# Patient Record
Sex: FEMALE | Race: BLACK OR AFRICAN AMERICAN | NOT HISPANIC OR LATINO | URBAN - METROPOLITAN AREA
[De-identification: names, ages, dates, MRNs, and addresses within clinical notes are randomized per-mention and may not be internally consistent; named-entity substitution may affect disease eponyms.]

---

## 2021-03-31 ENCOUNTER — EMERGENCY (EMERGENCY)
Facility: HOSPITAL | Age: 31
LOS: 1 days | Discharge: HOME | End: 2021-03-31
Admitting: EMERGENCY MEDICINE
Payer: SELF-PAY

## 2021-03-31 VITALS
HEART RATE: 93 BPM | DIASTOLIC BLOOD PRESSURE: 65 MMHG | RESPIRATION RATE: 18 BRPM | SYSTOLIC BLOOD PRESSURE: 107 MMHG | TEMPERATURE: 98 F | OXYGEN SATURATION: 98 %

## 2021-03-31 VITALS
SYSTOLIC BLOOD PRESSURE: 128 MMHG | HEART RATE: 113 BPM | OXYGEN SATURATION: 97 % | RESPIRATION RATE: 18 BRPM | TEMPERATURE: 98 F | DIASTOLIC BLOOD PRESSURE: 64 MMHG | WEIGHT: 149.91 LBS

## 2021-03-31 DIAGNOSIS — R07.89 OTHER CHEST PAIN: ICD-10-CM

## 2021-03-31 DIAGNOSIS — D64.9 ANEMIA, UNSPECIFIED: ICD-10-CM

## 2021-03-31 DIAGNOSIS — R00.2 PALPITATIONS: ICD-10-CM

## 2021-03-31 DIAGNOSIS — F19.929 OTHER PSYCHOACTIVE SUBSTANCE USE, UNSPECIFIED WITH INTOXICATION, UNSPECIFIED: ICD-10-CM

## 2021-03-31 LAB
ALBUMIN SERPL ELPH-MCNC: 4.1 G/DL — SIGNIFICANT CHANGE UP (ref 3.4–5)
ALP SERPL-CCNC: 60 U/L — SIGNIFICANT CHANGE UP (ref 40–120)
ALT FLD-CCNC: 26 U/L — SIGNIFICANT CHANGE UP (ref 12–42)
ANION GAP SERPL CALC-SCNC: 12 MMOL/L — SIGNIFICANT CHANGE UP (ref 9–16)
APTT BLD: 29.3 SEC — SIGNIFICANT CHANGE UP (ref 27.5–35.5)
AST SERPL-CCNC: 16 U/L — SIGNIFICANT CHANGE UP (ref 15–37)
BASOPHILS # BLD AUTO: 0.03 K/UL — SIGNIFICANT CHANGE UP (ref 0–0.2)
BASOPHILS NFR BLD AUTO: 0.3 % — SIGNIFICANT CHANGE UP (ref 0–2)
BILIRUB SERPL-MCNC: 0.2 MG/DL — SIGNIFICANT CHANGE UP (ref 0.2–1.2)
BUN SERPL-MCNC: 17 MG/DL — SIGNIFICANT CHANGE UP (ref 7–23)
CALCIUM SERPL-MCNC: 9.3 MG/DL — SIGNIFICANT CHANGE UP (ref 8.5–10.5)
CHLORIDE SERPL-SCNC: 109 MMOL/L — HIGH (ref 96–108)
CO2 SERPL-SCNC: 21 MMOL/L — LOW (ref 22–31)
CREAT SERPL-MCNC: 0.95 MG/DL — SIGNIFICANT CHANGE UP (ref 0.5–1.3)
EOSINOPHIL # BLD AUTO: 0 K/UL — SIGNIFICANT CHANGE UP (ref 0–0.5)
EOSINOPHIL NFR BLD AUTO: 0 % — SIGNIFICANT CHANGE UP (ref 0–6)
GLUCOSE SERPL-MCNC: 135 MG/DL — HIGH (ref 70–99)
HCG SERPL-ACNC: 1 MIU/ML — SIGNIFICANT CHANGE UP
HCT VFR BLD CALC: 31 % — LOW (ref 34.5–45)
HGB BLD-MCNC: 9.1 G/DL — LOW (ref 11.5–15.5)
HYPOCHROMIA BLD QL: SLIGHT — SIGNIFICANT CHANGE UP
IMM GRANULOCYTES NFR BLD AUTO: 0.5 % — SIGNIFICANT CHANGE UP (ref 0–1.5)
INR BLD: 1.01 — SIGNIFICANT CHANGE UP (ref 0.88–1.16)
LYMPHOCYTES # BLD AUTO: 0.6 K/UL — LOW (ref 1–3.3)
LYMPHOCYTES # BLD AUTO: 6.5 % — LOW (ref 13–44)
MANUAL SMEAR VERIFICATION: SIGNIFICANT CHANGE UP
MCHC RBC-ENTMCNC: 21.5 PG — LOW (ref 27–34)
MCHC RBC-ENTMCNC: 29.4 GM/DL — LOW (ref 32–36)
MCV RBC AUTO: 73.1 FL — LOW (ref 80–100)
MICROCYTES BLD QL: SLIGHT — SIGNIFICANT CHANGE UP
MONOCYTES # BLD AUTO: 0.49 K/UL — SIGNIFICANT CHANGE UP (ref 0–0.9)
MONOCYTES NFR BLD AUTO: 5.3 % — SIGNIFICANT CHANGE UP (ref 2–14)
NEUTROPHILS # BLD AUTO: 8.06 K/UL — HIGH (ref 1.8–7.4)
NEUTROPHILS NFR BLD AUTO: 87.4 % — HIGH (ref 43–77)
NRBC # BLD: 0 /100 WBCS — SIGNIFICANT CHANGE UP (ref 0–0)
NT-PROBNP SERPL-SCNC: 71 PG/ML — SIGNIFICANT CHANGE UP
PLAT MORPH BLD: NORMAL — SIGNIFICANT CHANGE UP
PLATELET # BLD AUTO: 275 K/UL — SIGNIFICANT CHANGE UP (ref 150–400)
POTASSIUM SERPL-MCNC: 4.1 MMOL/L — SIGNIFICANT CHANGE UP (ref 3.5–5.3)
POTASSIUM SERPL-SCNC: 4.1 MMOL/L — SIGNIFICANT CHANGE UP (ref 3.5–5.3)
PROT SERPL-MCNC: 7.7 G/DL — SIGNIFICANT CHANGE UP (ref 6.4–8.2)
PROTHROM AB SERPL-ACNC: 11.9 SEC — SIGNIFICANT CHANGE UP (ref 10.6–13.6)
RBC # BLD: 4.24 M/UL — SIGNIFICANT CHANGE UP (ref 3.8–5.2)
RBC # FLD: 16.2 % — HIGH (ref 10.3–14.5)
RBC BLD AUTO: ABNORMAL
SODIUM SERPL-SCNC: 142 MMOL/L — SIGNIFICANT CHANGE UP (ref 132–145)
WBC # BLD: 9.23 K/UL — SIGNIFICANT CHANGE UP (ref 3.8–10.5)
WBC # FLD AUTO: 9.23 K/UL — SIGNIFICANT CHANGE UP (ref 3.8–10.5)

## 2021-03-31 PROCEDURE — 93010 ELECTROCARDIOGRAM REPORT: CPT

## 2021-03-31 PROCEDURE — 99284 EMERGENCY DEPT VISIT MOD MDM: CPT

## 2021-03-31 PROCEDURE — 99053 MED SERV 10PM-8AM 24 HR FAC: CPT

## 2021-03-31 NOTE — ED ADULT TRIAGE NOTE - CHIEF COMPLAINT QUOTE
BIBA ambulatory staying in a hotel, complaining of chest discomfort and palpitations after eating a piece of brownie. Not her first time, as per patient. Denies alcohol use and denies use of birth control.

## 2021-03-31 NOTE — ED PROVIDER NOTE - PATIENT PORTAL LINK FT
You can access the FollowMyHealth Patient Portal offered by [facility] by registering at the following website: [email or web address]. By joining [facility]’s FollowMyHealth portal, you will also be able to view your health information using other applications (apps) compatible with our system.

## 2021-03-31 NOTE — ED PROVIDER NOTE - CARE PROVIDER_API CALL
Mary Carmen Conklin (DO)  [place] [address]  Phone: [phone]  Fax: [phone]  Follow Up Time:

## 2021-03-31 NOTE — ED PROVIDER NOTE - OBJECTIVE STATEMENT
29 y/o F presents to ED c/o chest discomfort and her heart racing after eating an edible brownie.  She states her heart started to race.  She reports never having had an edible and the feeling made her nervous.    Pt denies trauma/falls, fevers/chills, neck or back pain, headache, visual changes, sore throat, chest pain, cough, SOB, abd pain, n/v/d, dysuria, hematuria, weakness, dizziness, numbness, lower extremity swelling, rash, sick contacts, recent hospitalizations, recent travels.

## 2022-05-21 ENCOUNTER — HOSPITAL ENCOUNTER (EMERGENCY)
Facility: HOSPITAL | Age: 32
Discharge: HOME | End: 2022-05-21
Attending: EMERGENCY MEDICINE
Payer: COMMERCIAL

## 2022-05-21 ENCOUNTER — APPOINTMENT (EMERGENCY)
Dept: RADIOLOGY | Facility: HOSPITAL | Age: 32
End: 2022-05-21
Payer: COMMERCIAL

## 2022-05-21 VITALS
OXYGEN SATURATION: 100 % | HEART RATE: 59 BPM | RESPIRATION RATE: 18 BRPM | TEMPERATURE: 98.1 F | BODY MASS INDEX: 24.63 KG/M2 | HEIGHT: 62 IN | SYSTOLIC BLOOD PRESSURE: 91 MMHG | WEIGHT: 133.82 LBS | DIASTOLIC BLOOD PRESSURE: 53 MMHG

## 2022-05-21 DIAGNOSIS — F41.9 ANXIETY: Primary | ICD-10-CM

## 2022-05-21 DIAGNOSIS — R07.9 CHEST PAIN: ICD-10-CM

## 2022-05-21 LAB
ALBUMIN SERPL BCP-MCNC: 3.6 G/DL (ref 3.5–5)
ALP SERPL-CCNC: 46 U/L (ref 46–116)
ALT SERPL W P-5'-P-CCNC: 22 U/L (ref 12–78)
ANION GAP SERPL CALCULATED.3IONS-SCNC: 9 MMOL/L (ref 4–13)
AST SERPL W P-5'-P-CCNC: 51 U/L (ref 5–45)
ATRIAL RATE: 59 BPM
ATRIAL RATE: 70 BPM
BASOPHILS # BLD AUTO: 0.05 THOUSANDS/ΜL (ref 0–0.1)
BASOPHILS NFR BLD AUTO: 1 % (ref 0–1)
BILIRUB SERPL-MCNC: 0.64 MG/DL (ref 0.2–1)
BUN SERPL-MCNC: 8 MG/DL (ref 5–25)
CALCIUM SERPL-MCNC: 8.8 MG/DL (ref 8.3–10.1)
CARDIAC TROPONIN I PNL SERPL HS: <2 NG/L
CARDIAC TROPONIN I PNL SERPL HS: <2 NG/L
CHLORIDE SERPL-SCNC: 106 MMOL/L (ref 100–108)
CO2 SERPL-SCNC: 25 MMOL/L (ref 21–32)
CREAT SERPL-MCNC: 0.54 MG/DL (ref 0.6–1.3)
EOSINOPHIL # BLD AUTO: 0.06 THOUSAND/ΜL (ref 0–0.61)
EOSINOPHIL NFR BLD AUTO: 1 % (ref 0–6)
ERYTHROCYTE [DISTWIDTH] IN BLOOD BY AUTOMATED COUNT: 17.2 % (ref 11.6–15.1)
GFR SERPL CREATININE-BSD FRML MDRD: 126 ML/MIN/1.73SQ M
GLUCOSE SERPL-MCNC: 75 MG/DL (ref 65–140)
HCG SERPL QL: NEGATIVE
HCT VFR BLD AUTO: 31.8 % (ref 34.8–46.1)
HGB BLD-MCNC: 9.5 G/DL (ref 11.5–15.4)
IMM GRANULOCYTES # BLD AUTO: 0.01 THOUSAND/UL (ref 0–0.2)
IMM GRANULOCYTES NFR BLD AUTO: 0 % (ref 0–2)
LYMPHOCYTES # BLD AUTO: 1.73 THOUSANDS/ΜL (ref 0.6–4.47)
LYMPHOCYTES NFR BLD AUTO: 31 % (ref 14–44)
MCH RBC QN AUTO: 21 PG (ref 26.8–34.3)
MCHC RBC AUTO-ENTMCNC: 29.9 G/DL (ref 31.4–37.4)
MCV RBC AUTO: 70 FL (ref 82–98)
MONOCYTES # BLD AUTO: 0.39 THOUSAND/ΜL (ref 0.17–1.22)
MONOCYTES NFR BLD AUTO: 7 % (ref 4–12)
NEUTROPHILS # BLD AUTO: 3.31 THOUSANDS/ΜL (ref 1.85–7.62)
NEUTS SEG NFR BLD AUTO: 60 % (ref 43–75)
NRBC BLD AUTO-RTO: 0 /100 WBCS
P AXIS: 63 DEGREES
P AXIS: 64 DEGREES
PLATELET # BLD AUTO: 358 THOUSANDS/UL (ref 149–390)
PMV BLD AUTO: 10.3 FL (ref 8.9–12.7)
POTASSIUM SERPL-SCNC: 4.4 MMOL/L (ref 3.5–5.3)
PR INTERVAL: 132 MS
PR INTERVAL: 132 MS
PROT SERPL-MCNC: 7.4 G/DL (ref 6.4–8.2)
QRS AXIS: 93 DEGREES
QRS AXIS: 94 DEGREES
QRSD INTERVAL: 86 MS
QRSD INTERVAL: 86 MS
QT INTERVAL: 400 MS
QT INTERVAL: 418 MS
QTC INTERVAL: 413 MS
QTC INTERVAL: 432 MS
RBC # BLD AUTO: 4.53 MILLION/UL (ref 3.81–5.12)
SODIUM SERPL-SCNC: 140 MMOL/L (ref 136–145)
T WAVE AXIS: 55 DEGREES
T WAVE AXIS: 65 DEGREES
VENTRICULAR RATE: 59 BPM
VENTRICULAR RATE: 70 BPM
WBC # BLD AUTO: 5.55 THOUSAND/UL (ref 4.31–10.16)

## 2022-05-21 PROCEDURE — 71046 X-RAY EXAM CHEST 2 VIEWS: CPT

## 2022-05-21 PROCEDURE — 99285 EMERGENCY DEPT VISIT HI MDM: CPT | Performed by: EMERGENCY MEDICINE

## 2022-05-21 PROCEDURE — 84484 ASSAY OF TROPONIN QUANT: CPT | Performed by: EMERGENCY MEDICINE

## 2022-05-21 PROCEDURE — 84703 CHORIONIC GONADOTROPIN ASSAY: CPT | Performed by: EMERGENCY MEDICINE

## 2022-05-21 PROCEDURE — 93010 ELECTROCARDIOGRAM REPORT: CPT | Performed by: INTERNAL MEDICINE

## 2022-05-21 PROCEDURE — NC001 PR NO CHARGE: Performed by: EMERGENCY MEDICINE

## 2022-05-21 PROCEDURE — 93005 ELECTROCARDIOGRAM TRACING: CPT

## 2022-05-21 PROCEDURE — 36415 COLL VENOUS BLD VENIPUNCTURE: CPT | Performed by: EMERGENCY MEDICINE

## 2022-05-21 PROCEDURE — 99283 EMERGENCY DEPT VISIT LOW MDM: CPT

## 2022-05-21 PROCEDURE — 85025 COMPLETE CBC W/AUTO DIFF WBC: CPT | Performed by: EMERGENCY MEDICINE

## 2022-05-21 PROCEDURE — 80053 COMPREHEN METABOLIC PANEL: CPT | Performed by: EMERGENCY MEDICINE

## 2022-05-21 RX ORDER — SODIUM CHLORIDE 9 MG/ML
3 INJECTION INTRAVENOUS
Status: DISCONTINUED | OUTPATIENT
Start: 2022-05-21 | End: 2022-05-21 | Stop reason: HOSPADM

## 2022-05-21 NOTE — ED PROVIDER NOTES
History  Chief Complaint   Patient presents with    Anxiety     Pt stated she is currently staying in her car; homeless c/o "heavy weight on chest"  Started 20 minutes ago  33 y/o female, hx of anxiety, presents to the ED for chest pain and anxiety  Patient states that she developed heaviness in the center of her chest about 20 minutes ago  States that she feels anxious as well  Denies any fever, cough, sob, abd pain, n/v, d/c, or urinary symptoms  States that she is homeless and living in her car  States that she was having "flashbacks" when the symptoms began  She denies any SI/HI, drug/alcohol use, or hallucinations  Has not tried anything for the symptoms  No other complaints  History provided by:  Patient  Chest Pain  Pain location:  Substernal area  Pain quality comment:  Heaviness   Pain severity:  Mild  Onset quality:  Sudden  Duration:  20 minutes  Timing:  Constant  Progression:  Unchanged  Chronicity:  New  Relieved by:  None tried  Worsened by:  Nothing tried  Ineffective treatments:  None tried  Associated symptoms: no abdominal pain, no anxiety, no cough, no fever, no headache, no nausea, no numbness, no shortness of breath, not vomiting and no weakness        None       History reviewed  No pertinent past medical history  History reviewed  No pertinent surgical history  History reviewed  No pertinent family history  I have reviewed and agree with the history as documented  E-Cigarette/Vaping     E-Cigarette/Vaping Substances     Social History     Tobacco Use    Smoking status: Never Smoker    Smokeless tobacco: Never Used   Substance Use Topics    Alcohol use: Never    Drug use: Never       Review of Systems   Constitutional: Negative for chills and fever  HENT: Negative for congestion, ear pain and sore throat  Eyes: Negative for pain and visual disturbance  Respiratory: Negative for cough, shortness of breath and wheezing  Cardiovascular: Positive for chest pain  Negative for leg swelling  Gastrointestinal: Negative for abdominal pain, diarrhea, nausea and vomiting  Genitourinary: Negative for dysuria, frequency, hematuria and urgency  Musculoskeletal: Negative for neck pain and neck stiffness  Skin: Negative for rash and wound  Neurological: Negative for weakness, numbness and headaches  Psychiatric/Behavioral: Negative for agitation and confusion  All other systems reviewed and are negative  Physical Exam  Physical Exam  Vitals and nursing note reviewed  Constitutional:       Appearance: She is well-developed  HENT:      Head: Normocephalic and atraumatic  Eyes:      Pupils: Pupils are equal, round, and reactive to light  Cardiovascular:      Rate and Rhythm: Normal rate and regular rhythm  Pulmonary:      Effort: Pulmonary effort is normal       Breath sounds: Normal breath sounds  Abdominal:      General: Bowel sounds are normal       Palpations: Abdomen is soft  Musculoskeletal:         General: Normal range of motion  Cervical back: Normal range of motion and neck supple  Skin:     General: Skin is warm and dry  Neurological:      General: No focal deficit present  Mental Status: She is alert and oriented to person, place, and time        Comments: No focal deficits         Vital Signs  ED Triage Vitals [05/21/22 0413]   Temperature Pulse Respirations Blood Pressure SpO2   98 1 °F (36 7 °C) 66 18 111/67 100 %      Temp Source Heart Rate Source Patient Position - Orthostatic VS BP Location FiO2 (%)   Oral Monitor Sitting Right arm --      Pain Score       No Pain           Vitals:    05/21/22 0413 05/21/22 0600   BP: 111/67 91/53   Pulse: 66 59   Patient Position - Orthostatic VS: Sitting Lying         Visual Acuity      ED Medications  Medications - No data to display    Diagnostic Studies  Results Reviewed     Procedure Component Value Units Date/Time    HS Troponin I 2hr [[id number]] Collected: 05/21/22 0718    Lab Status: Final result Specimen: Blood from Hand, Right Updated: 05/21/22 0751     hs TnI 2hr <2 ng/L      Delta 2hr hsTnI --    Comprehensive metabolic panel [[id number]]  (Abnormal) Collected: 05/21/22 0500    Lab Status: Final result Specimen: Blood from Arm, Right Updated: 05/21/22 0557     Sodium 140 mmol/L      Potassium 4 4 mmol/L      Chloride 106 mmol/L      CO2 25 mmol/L      ANION GAP 9 mmol/L      BUN 8 mg/dL      Creatinine 0 54 mg/dL      Glucose 75 mg/dL      Calcium 8 8 mg/dL      AST 51 U/L      ALT 22 U/L      Alkaline Phosphatase 46 U/L      Total Protein 7 4 g/dL      Albumin 3 6 g/dL      Total Bilirubin 0 64 mg/dL      eGFR 126 ml/min/1 73sq m     Narrative:      National Kidney Disease Foundation guidelines for Chronic Kidney Disease (CKD):     Stage 1 with normal or high GFR (GFR > 90 mL/min/1 73 square meters)    Stage 2 Mild CKD (GFR = 60-89 mL/min/1 73 square meters)    Stage 3A Moderate CKD (GFR = 45-59 mL/min/1 73 square meters)    Stage 3B Moderate CKD (GFR = 30-44 mL/min/1 73 square meters)    Stage 4 Severe CKD (GFR = 15-29 mL/min/1 73 square meters)    Stage 5 End Stage CKD (GFR <15 mL/min/1 73 square meters)  Note: GFR calculation is accurate only with a steady state creatinine    hCG, qualitative pregnancy [[id number]]  (Normal) Collected: 05/21/22 0500    Lab Status: Final result Specimen: Blood from Arm, Right Updated: 05/21/22 0557     Preg, Serum Negative    HS Troponin 0hr (reflex protocol) [[id number]]  (Normal) Collected: 05/21/22 0500    Lab Status: Final result Specimen: Blood from Arm, Right Updated: 05/21/22 0542     hs TnI 0hr <2 ng/L     CBC and differential [[id number]]  (Abnormal) Collected: 05/21/22 0500    Lab Status: Final result Specimen: Blood from Arm, Right Updated: 05/21/22 0516     WBC 5 55 Thousand/uL      RBC 4 53 Million/uL      Hemoglobin 9 5 g/dL      Hematocrit 31 8 %      MCV 70 fL      MCH 21 0 pg      MCHC 29 9 g/dL      RDW 17 2 %      MPV 10 3 fL Platelets 071 Thousands/uL      nRBC 0 /100 WBCs      Neutrophils Relative 60 %      Immat GRANS % 0 %      Lymphocytes Relative 31 %      Monocytes Relative 7 %      Eosinophils Relative 1 %      Basophils Relative 1 %      Neutrophils Absolute 3 31 Thousands/µL      Immature Grans Absolute 0 01 Thousand/uL      Lymphocytes Absolute 1 73 Thousands/µL      Monocytes Absolute 0 39 Thousand/µL      Eosinophils Absolute 0 06 Thousand/µL      Basophils Absolute 0 05 Thousands/µL                  X-ray chest 2 views   ED Interpretation by Saran Ramos DO (05/21 0636)   NAP       Final Result by Sabrina Earl MD (05/21 3706)      No acute cardiopulmonary disease  Workstation performed: [id number]                    Procedures  ECG 12 Lead Documentation Only    Date/Time: 5/21/2022 4:51 AM  Performed by: Saran Ramos DO  Authorized by: Saran Ramos DO     Indications / Diagnosis:  Chest pain   Patient location:  ED  Previous ECG:     Previous ECG:  Unavailable  Rate:     ECG rate:  59    ECG rate assessment: bradycardic    Rhythm:     Rhythm: sinus bradycardia    Ectopy:     Ectopy: none    QRS:     QRS axis:  Right    QRS intervals:  Normal  ST segments:     ST segments:  Normal  T waves:     T waves: normal               ED Course  ED Course as of 05/22/22 0713   Sat May 21, 2022   0543 hs TnI 0hr: <2                                             MDM  Number of Diagnoses or Management Options  Anxiety: new and requires workup  Chest pain: new and requires workup  Diagnosis management comments: Patient with chest pain and anxiety- will get cardiac workup including trop/ ekg  Case signed out to Dr Marylou Medina, pending delta trop/ekg and likely d/c home if neg         Amount and/or Complexity of Data Reviewed  Clinical lab tests: ordered and reviewed  Tests in the radiology section of CPT®: ordered and reviewed  Tests in the medicine section of CPT®: ordered and reviewed  Discussion of test results with the performing providers: yes  Decide to obtain previous medical records or to obtain history from someone other than the patient: yes  Obtain history from someone other than the patient: yes  Review and summarize past medical records: yes  Discuss the patient with other providers: yes    Patient Progress  Patient progress: improved      Disposition  Final diagnoses:   Anxiety   Chest pain     Time reflects when diagnosis was documented in both MDM as applicable and the Disposition within this note     Time User Action Codes Description Comment    5/21/2022  6:37 AM Ahsan ROMERO Add [F41 9] Anxiety     5/21/2022  6:37 AM Tahira Solis Add [R07 9] Chest pain       ED Disposition     ED Disposition   Discharge    Condition   Stable    Date/Time   Sat May 21, 2022  6:37 AM    Comment   Zhanna Meter discharge to home/self care  Follow-up Information     Follow up With Specialties Details Why Contact Info Additional [address], 10 [facility] Internal Medicine, Nurse Practitioner Call in 1 day for follow up within 1 week [address]  [facility] [phone]       5[address] [facility] Emergency Department Emergency Medicine Go to  immediately for any new or worsening symptoms [address] [facility]  [address] [facility] 109 [facility] Emergency Department, [address] [place] [address], [id number]          There are no discharge medications for this patient  No discharge procedures on file      PDMP Review     None          ED Provider  Electronically Signed by           Radha Todd DO  05/22/22 9783

## 2022-05-21 NOTE — ED PROVIDER NOTES
Care patient assumed from Dr Nancy Trinidad  For full details, please see her note  Briefly, this is a 17-year-old female with a history of anxiety who presents here today with chest pain starting shortly prior to arrival   Workup thus far has been normal   She is pending delta troponin and if this is normal can be discharged home  Second troponin is still undetectable  She is to be discharged home for further outpatient follow-up  Final diagnoses:   Anxiety   Chest pain     Time reflects when diagnosis was documented in both MDM as applicable and the Disposition within this note     Time User Action Codes Description Comment    5/21/2022  6:37 AM Lizzeth ROMERO Add [F41 9] Anxiety     5/21/2022  6:37 AM Amy Rosario Add [R07 9] Chest pain       ED Disposition     ED Disposition   Discharge    Condition   Stable    Date/Time   Sat May 21, 2022  6:37 AM    Comment   Ciro Olivier discharge to home/self care                 Follow-up Information     Follow up With Specialties Details Why Contact Info Additional [address], 10 [facility]  Internal Medicine, Nurse Practitioner Call in 1 day for follow up within 1 week [address]  [place] [address] [phone]       5324 [facility] Emergency Department Emergency Medicine Go to  immediately for any new or worsening symptoms [facility] [place] [address]  [phone] [address] [facility] Emergency Department, [address] [place], [place], [address]             Tamia Callahan MD  05/21/22 7674

## 2022-05-23 ENCOUNTER — HOSPITAL ENCOUNTER (EMERGENCY)
Facility: HOSPITAL | Age: 32
Discharge: HOME | End: 2022-05-23
Attending: EMERGENCY MEDICINE | Admitting: EMERGENCY MEDICINE
Payer: COMMERCIAL

## 2022-05-23 VITALS
SYSTOLIC BLOOD PRESSURE: 118 MMHG | HEART RATE: 77 BPM | DIASTOLIC BLOOD PRESSURE: 73 MMHG | TEMPERATURE: 98.4 F | OXYGEN SATURATION: 100 % | RESPIRATION RATE: 16 BRPM

## 2022-05-23 DIAGNOSIS — F41.9 ANXIETY: Primary | ICD-10-CM

## 2022-05-23 LAB
ATRIAL RATE: 72 BPM
P AXIS: 78 DEGREES
PR INTERVAL: 128 MS
QRS AXIS: 93 DEGREES
QRSD INTERVAL: 80 MS
QT INTERVAL: 388 MS
QTC INTERVAL: 424 MS
T WAVE AXIS: 79 DEGREES
VENTRICULAR RATE: 72 BPM

## 2022-05-23 PROCEDURE — 99284 EMERGENCY DEPT VISIT MOD MDM: CPT | Performed by: EMERGENCY MEDICINE

## 2022-05-23 PROCEDURE — 93005 ELECTROCARDIOGRAM TRACING: CPT

## 2022-05-23 PROCEDURE — 99284 EMERGENCY DEPT VISIT MOD MDM: CPT

## 2022-05-23 PROCEDURE — 93010 ELECTROCARDIOGRAM REPORT: CPT | Performed by: INTERNAL MEDICINE

## 2022-05-23 NOTE — DISCHARGE INSTRUCTIONS
Follow up with resources as provided by crisis, return to ED if you feel like hurting yourself or anyone else or call 911

## 2022-05-23 NOTE — ED PROVIDER NOTES
History  Chief Complaint   Patient presents with    Anxiety     Pt states she was smoking marijuana with a friend this morning and now feels anxious      HPI  33 yo F presents with anxiety  She states that she smoked marijuana today and started feeling anxious after this  She denies SI/HI  She states that she wants to talk to crisis about resources because she feels like she needs to be on medications for symptoms  She was in the ED for the same two days ago and had chest pain, work up negative  None       No past medical history on file  No past surgical history on file  No family history on file  I have reviewed and agree with the history as documented  E-Cigarette/Vaping     E-Cigarette/Vaping Substances     Social History     Tobacco Use    Smoking status: Never Smoker    Smokeless tobacco: Never Used   Substance Use Topics    Alcohol use: Never    Drug use: Never       Review of Systems   Constitutional: Negative for chills and fever  HENT: Negative for dental problem and ear pain  Eyes: Negative for pain and redness  Respiratory: Negative for cough and shortness of breath  Cardiovascular: Negative for chest pain and palpitations  Gastrointestinal: Negative for abdominal pain and nausea  Endocrine: Negative for polydipsia and polyphagia  Genitourinary: Negative for dysuria and frequency  Musculoskeletal: Negative for arthralgias and joint swelling  Skin: Negative for color change and rash  Neurological: Negative for dizziness and headaches  Psychiatric/Behavioral: Negative for behavioral problems and confusion  The patient is nervous/anxious  All other systems reviewed and are negative  Physical Exam  Physical Exam  Vitals and nursing note reviewed  Constitutional:       General: She is not in acute distress  Appearance: She is well-developed  She is not diaphoretic  HENT:      Head: Atraumatic        Right Ear: External ear normal       Left Ear: External ear normal       Nose: Nose normal    Eyes:      Conjunctiva/sclera: Conjunctivae normal       Pupils: Pupils are equal, round, and reactive to light  Neck:      Vascular: No JVD  Cardiovascular:      Rate and Rhythm: Normal rate and regular rhythm  Heart sounds: Normal heart sounds  No murmur heard  Pulmonary:      Effort: Pulmonary effort is normal  No respiratory distress  Breath sounds: Normal breath sounds  No wheezing  Abdominal:      General: Bowel sounds are normal  There is no distension  Palpations: Abdomen is soft  Tenderness: There is no abdominal tenderness  Musculoskeletal:         General: Normal range of motion  Cervical back: Normal range of motion and neck supple  Skin:     General: Skin is warm and dry  Capillary Refill: Capillary refill takes less than 2 seconds  Neurological:      Mental Status: She is alert and oriented to person, place, and time  Cranial Nerves: No cranial nerve deficit     Psychiatric:         Behavior: Behavior normal          Vital Signs  ED Triage Vitals   Temperature Pulse Respirations Blood Pressure SpO2   05/23/22 1307 05/23/22 1200 05/23/22 1200 05/23/22 1200 05/23/22 1200   98 4 °F (36 9 °C) (!) 118 16 145/96 100 %      Temp src Heart Rate Source Patient Position - Orthostatic VS BP Location FiO2 (%)   -- 05/23/22 1200 05/23/22 1200 05/23/22 1200 --    Monitor Sitting Left arm       Pain Score       --                  Vitals:    05/23/22 1200 05/23/22 1307 05/23/22 1314   BP: 145/96 118/66 118/73   Pulse: (!) 118 79 77   Patient Position - Orthostatic VS: Sitting           Visual Acuity      ED Medications  Medications - No data to display    Diagnostic Studies  Results Reviewed     None                 No orders to display              Procedures  ECG 12 Lead Documentation Only    Date/Time: 5/23/2022 2:20 PM  Performed by: Shahnaz Merlos MD  Authorized by: Shahnaz Merlos MD     Comments:      NSR rate of 72 RAD no acute ST elevations or depressions             ED Course                               SBIRT 20yo+    Flowsheet Row Most Recent Value   SBIRT (23 yo +)    In order to provide better care to our patients, we are screening all of our patients for alcohol and drug use  Would it be okay to ask you these screening questions? Unable to answer at this time Filed at: 05/23/2022 1303                    MDM  Patient presents with anxiety after smoking marijuana today  Denies HI/SI  Requesting to speak with crisis worker for outpatient resources  Patient was recently seen in the ED for the same and had negative workup for her anxiety/chest pain  Patient provided with outpatient resources by ED crisis  Disposition  Final diagnoses:   Anxiety     Time reflects when diagnosis was documented in both MDM as applicable and the Disposition within this note     Time User Action Codes Description Comment    5/23/2022  1:55 PM Calin Keller Add [F41 9] Anxiety       ED Disposition     None      Follow-up Information    None         Patient's Medications    No medications on file       No discharge procedures on file      PDMP Review     None          ED Provider  Electronically Signed by           Yusuf Martins MD  05/23/22 4204

## 2022-05-23 NOTE — ED NOTES
Pt presents to the ED from home as a self referral  Pt reports having smoking "weed" with this yohan and "starting feeling anxious afterwards " Pt denies SI's / HI's and or AVH's  Pt does not report any hx of MH illness  Pt is receptive to receiving an OP referral packet at this time  Pt reports, "the yohan seems pretty cool who I smoke with but I don't know him that well " CW suggested pt be cautious when using illegal substances w/unfamiliar people  Pt denies any hx of MH illness      TDS, CW

## 2022-07-09 ENCOUNTER — EMERGENCY (EMERGENCY)
Facility: HOSPITAL | Age: 32
LOS: 1 days | Discharge: HOME | End: 2022-07-09
Admitting: EMERGENCY MEDICINE

## 2022-07-09 VITALS
SYSTOLIC BLOOD PRESSURE: 110 MMHG | HEART RATE: 80 BPM | RESPIRATION RATE: 18 BRPM | TEMPERATURE: 98 F | WEIGHT: 149.91 LBS | OXYGEN SATURATION: 100 % | DIASTOLIC BLOOD PRESSURE: 78 MMHG

## 2022-07-09 PROBLEM — Z78.9 OTHER SPECIFIED HEALTH STATUS: Chronic | Status: ACTIVE | Noted: 2021-03-31

## 2022-07-09 PROCEDURE — 99284 EMERGENCY DEPT VISIT MOD MDM: CPT

## 2022-07-09 PROCEDURE — 99053 MED SERV 10PM-8AM 24 HR FAC: CPT

## 2022-07-09 RX ORDER — ACETAMINOPHEN 500 MG
650 TABLET ORAL ONCE
Refills: 0 | Status: COMPLETED | OUTPATIENT
Start: 2022-07-09 | End: 2022-07-09

## 2022-07-09 RX ADMIN — Medication 500 MILLIGRAM(S): at 07:17

## 2022-07-09 RX ADMIN — Medication 650 MILLIGRAM(S): at 07:17

## 2022-07-09 NOTE — ED PROVIDER NOTE - OBJECTIVE STATEMENT
31-year-old female with no known past medical history, undomiciled, brought in by ambulance complaining of bilateral feet pain for "a while."  Patient reports prolonged walking and has been walking a lot for the past day or so and now with swelling and pain to the bottom of the feet. Denies fever, chills, trauma, fall, FB sensation, change in ROM/sensation, redness, paresthesia, purulent d/c, N/V, HA, dizziness, LOC, CP, SOB, and focal weakness

## 2022-07-09 NOTE — ED PROVIDER NOTE - PATIENT PORTAL LINK FT
You can access the FollowMyHealth Patient Portal offered by [facility] by registering at the following website: [email or web address]. By joining [employer] [facility]’s FollowMyHealth portal, you will also be able to view your health information using other applications (apps) compatible with our system.

## 2022-07-09 NOTE — ED PROVIDER NOTE - PHYSICAL EXAMINATION
Gen - WDWN, NAD, comfortable and non-toxic appearing  Skin - warm, dry, mild excoriation to the dorsum aspect of the L foot, no streaking, burrowing, or dc    HEENT - AT/NC, no nasal discharge, airway patent, neck supple and FROM  MS - No acute or gross deformities noted to extremities. b/l feet with poor hygiene and intact blisters to b/l lateral aspect of the soles, no focal erythema/fluctuance/dc/crepitus or warmth, no streaking, compartment soft, NV intact, +SILT, symmetric distal pulses   Neuro - AxOx3, ambulatory without gait disturbance 19-Aug-2019

## 2022-07-09 NOTE — ED ADULT TRIAGE NOTE - AS TEMP SITE
28F pmh psych on lamictal/lexapro,, s/p cholecystectomy, p/w 18 days of diffuse abd pain, constipation and intermittent nbnb emesis. not tolerating po. denies opiates. no fever, chills. no dysuria, freq, hematuria. lmp 1 mo ago. no cp, sob, cough/uri. tried taking miralax 2x with no help. 2 yrs ago saw GI Pamar states had CT scan and was told shes "backed up."  1ppd smoker and marijuana. denies other drugs/opaites. no etoh. oral

## 2022-07-09 NOTE — ED PROVIDER NOTE - NSFOLLOWUPINSTRUCTIONS_ED_ALL_ED_FT
Follow up with your primary care doctor or clinics listed below if you do not have a doctor,    [address] [address]  To make an appointment, call [phone]    [facility] [place]  Address: [facility] [address] [place] [id number]  Appointment Center: [phone] ([phone])     [facility] is a good referral line for crisis and substance abuse help.  [facility] has drop in programs all over the city.    Return to the ER for Emergencies.  Return immediately for any new or worsening symptoms or any new concerns

## 2022-07-09 NOTE — ED PROVIDER NOTE - CLINICAL SUMMARY MEDICAL DECISION MAKING FREE TEXT BOX
medical screening exam has been performed.  Pt with no acute trauma or emergencies noted and exam wnl.  chronic blisters on exam without acute superimposed bacterial infection, given dose of APAP and naproxen, NV intact, ambulatory with steady gait, medically stable for dc
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
## 2022-07-10 DIAGNOSIS — M79.671 PAIN IN RIGHT FOOT: ICD-10-CM

## 2022-07-10 DIAGNOSIS — S90.822A BLISTER (NONTHERMAL), LEFT FOOT, INITIAL ENCOUNTER: ICD-10-CM

## 2022-07-10 DIAGNOSIS — S90.821A BLISTER (NONTHERMAL), RIGHT FOOT, INITIAL ENCOUNTER: ICD-10-CM
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
## 2022-08-26 ENCOUNTER — HOSPITAL ENCOUNTER (EMERGENCY)
Facility: HOSPITAL | Age: 32
Discharge: HOME | End: 2022-08-27
Attending: EMERGENCY MEDICINE
Payer: COMMERCIAL

## 2022-08-26 VITALS
OXYGEN SATURATION: 100 % | SYSTOLIC BLOOD PRESSURE: 123 MMHG | RESPIRATION RATE: 18 BRPM | TEMPERATURE: 98.4 F | HEART RATE: 84 BPM | DIASTOLIC BLOOD PRESSURE: 75 MMHG

## 2022-08-26 DIAGNOSIS — H10.13 ALLERGIC CONJUNCTIVITIS, BILATERAL: Primary | ICD-10-CM

## 2022-08-26 PROCEDURE — 99283 EMERGENCY DEPT VISIT LOW MDM: CPT

## 2022-08-26 RX ORDER — KETOTIFEN FUMARATE 0.35 MG/ML
1 SOLUTION/ DROPS OPHTHALMIC ONCE
Status: COMPLETED | OUTPATIENT
Start: 2022-08-26 | End: 2022-08-26

## 2022-08-26 RX ORDER — KETOTIFEN FUMARATE 0.35 MG/ML
1 SOLUTION/ DROPS OPHTHALMIC 2 TIMES DAILY
Qty: 5 ML | Refills: 0 | Status: SHIPPED | OUTPATIENT
Start: 2022-08-26 | End: 2022-09-02

## 2022-08-26 RX ADMIN — KETOTIFEN FUMARATE 1 DROP: 0.35 SOLUTION/ DROPS OPHTHALMIC at 23:58

## 2022-08-27 PROCEDURE — 99284 EMERGENCY DEPT VISIT MOD MDM: CPT | Performed by: PHYSICIAN ASSISTANT

## 2022-08-27 NOTE — DISCHARGE INSTRUCTIONS
Use eye drops as prescribed  Use Flonase nasal spray daily for your congestion  Take Zyrtec daily      Please follow-up with [facility] if symptoms persist

## 2022-08-27 NOTE — ED PROVIDER NOTES
History  Chief Complaint   Patient presents with    Eye Pain     Pt c/o itchy and burning eyes for the last 6 days  Denies any injury or getting anything in her eyes  Pt has not tried taking any OTC medications  25yo female with no significant past medical history presenting for evaluation of bilateral eye discomfort x6 days  She reports a burning pain in both eyes as well as some mild redness  She is also experiencing nasal congestion  No known history of seasonal allergies  She denies any trauma  No foreign body sensation, photophobia, blurred vision, double vision, fevers  No contact lens use or glasses  History provided by:  Patient   used: No    Eye Pain  Location:  Bilateral  Quality:  Burning  Severity:  Mild  Onset quality:  Gradual  Duration:  6 days  Timing:  Constant  Progression:  Unchanged  Chronicity:  New  Relieved by:  Nothing  Worsened by:  Nothing  Ineffective treatments:  None tried  Associated symptoms: congestion    Associated symptoms: no fever, no rash and no shortness of breath        None       Past Medical History:   Diagnosis Date    Addiction to drug [facility])        History reviewed  No pertinent surgical history  History reviewed  No pertinent family history  I have reviewed and agree with the history as documented  E-Cigarette/Vaping    E-Cigarette Use Never User      E-Cigarette/Vaping Substances    Nicotine No     THC No     CBD No     Flavoring No     Other No     Unknown No      Social History     Tobacco Use    Smoking status: Never Smoker    Smokeless tobacco: Never Used   Vaping Use    Vaping Use: Never used   Substance Use Topics    Alcohol use: Never     Comment: socially    Drug use: Yes     Types: Marijuana       Review of Systems   Constitutional: Negative for chills and fever  HENT: Positive for congestion  Negative for drooling  Eyes: Positive for pain and redness  Negative for photophobia and visual disturbance  Respiratory: Negative for shortness of breath and stridor  Musculoskeletal: Negative for neck pain and neck stiffness  Skin: Negative for color change and rash  Psychiatric/Behavioral: Negative for confusion  The patient is not nervous/anxious  All other systems reviewed and are negative  Physical Exam  Physical Exam  Vitals and nursing note reviewed  Constitutional:       General: She is not in acute distress  Appearance: Normal appearance  She is not toxic-appearing  HENT:      Head: Normocephalic and atraumatic  Right Ear: External ear normal       Left Ear: External ear normal       Nose: Congestion present  Eyes:      General: Vision grossly intact  No scleral icterus  Right eye: No discharge  Left eye: No discharge  Extraocular Movements: Extraocular movements intact  Right eye: Normal extraocular motion and no nystagmus  Left eye: Normal extraocular motion and no nystagmus  Conjunctiva/sclera: Conjunctivae normal       Pupils: Pupils are equal, round, and reactive to light  Comments: Eyes appear grossly normal   No obvious conjunctival injection or discharge noted  PERRL  EOMs intact  Visual acuity 20/20 bilaterally  Cardiovascular:      Rate and Rhythm: Normal rate  Pulmonary:      Effort: Pulmonary effort is normal  No respiratory distress  Breath sounds: No stridor  Musculoskeletal:         General: No deformity  Normal range of motion  Cervical back: Normal range of motion  Skin:     General: Skin is warm and dry  Neurological:      General: No focal deficit present  Mental Status: She is alert  Mental status is at baseline     Psychiatric:         Mood and Affect: Mood normal          Behavior: Behavior normal          Vital Signs  ED Triage Vitals [08/26/22 2324]   Temperature Pulse Respirations Blood Pressure SpO2   98 4 °F (36 9 °C) 84 18 123/75 100 %      Temp Source Heart Rate Source Patient Position - Orthostatic VS BP Location FiO2 (%)   Oral Monitor Sitting Left arm --      Pain Score       --           Vitals:    08/26/22 2324   BP: 123/75   Pulse: 84   Patient Position - Orthostatic VS: Sitting         Visual Acuity  Visual Acuity    Flowsheet Row Most Recent Value   Visual acuity R eye is 20/20   Visual acuity Left eye is 20/20   Visual acuity in both eyes is 20/20   Wearing corrective eyewear/lenses? No   No corrective eyewear/lenses Yes          ED Medications  Medications   ketotifen (ZADITOR) 0 025 % ophthalmic solution 1 drop (1 drop Both Eyes Given 8/26/22 8492)       Diagnostic Studies  Results Reviewed     None                 No orders to display              Procedures  Procedures         ED Course                     MDM  Number of Diagnoses or Management Options  Allergic conjunctivitis, bilateral: new and does not require workup  Diagnosis management comments: 27yo female presenting for burning eye discomfort x 6 days with associated nasal congestion  No foreign body sensation or visual disturbance  Vitals stable  Eyes appear grossly normal on exam  No conjunctival injection present  Visual acuity normal  Suspect allergic conjunctivitis  She was given a script for Zaditor drops  Also advised Flonase nasal spray and Zyrte  Advised f/u with [facility] if symptoms persist  Patient discharged in stable condition  Risk of Complications, Morbidity, and/or Mortality  Presenting problems: low  Diagnostic procedures: low  Management options: low    Patient Progress  Patient progress: stable      Disposition  Final diagnoses:    Allergic conjunctivitis, bilateral     Time reflects when diagnosis was documented in both MDM as applicable and the Disposition within this note     Time User Action Codes Description Comment    8/26/2022 11:34  [facility] Chantelle Mckeon Add [H10 13] Allergic conjunctivitis, bilateral       ED Disposition     ED Disposition   Discharge    Condition   Stable    Date/Time Fri Aug 26, 2022 11:34 PM    Comment   Brianna Hendricks discharge to home/self care  Follow-up Information     Follow up With Specialties Details Why Contact Info Additional [address] Ophthalmology Schedule an appointment as soon as possible for a visit   [address] [place] [address] Emergency Department Emergency Medicine  If symptoms worsen [address] Emergency Department, [address] [place] [address], [phone]          Discharge Medication List as of 8/26/2022 11:35 PM      START taking these medications    Details   ketotifen (ZADITOR) 0 025 % ophthalmic solution Administer 1 drop to both eyes 2 (two) times a day for 7 days, Starting Fri 8/26/2022, Until Fri 9/2/2022, Print             No discharge procedures on file      PDMP Review     None          ED Provider  Electronically Signed by           Dinh Guo PA-C  08/27/22 8653

## 2022-09-03 ENCOUNTER — HOSPITAL ENCOUNTER (EMERGENCY)
Facility: HOSPITAL | Age: 32
Discharge: HOME | End: 2022-09-04
Attending: EMERGENCY MEDICINE
Payer: COMMERCIAL

## 2022-09-03 DIAGNOSIS — R10.30 LOWER ABDOMINAL PAIN: Primary | ICD-10-CM

## 2022-09-03 PROCEDURE — 99285 EMERGENCY DEPT VISIT HI MDM: CPT

## 2022-09-04 ENCOUNTER — APPOINTMENT (EMERGENCY)
Dept: CT IMAGING | Facility: HOSPITAL | Age: 32
End: 2022-09-04
Payer: COMMERCIAL

## 2022-09-04 VITALS
HEART RATE: 76 BPM | OXYGEN SATURATION: 100 % | DIASTOLIC BLOOD PRESSURE: 58 MMHG | RESPIRATION RATE: 18 BRPM | TEMPERATURE: 98 F | SYSTOLIC BLOOD PRESSURE: 105 MMHG

## 2022-09-04 LAB
ALBUMIN SERPL BCP-MCNC: 3.7 G/DL (ref 3.5–5)
ALP SERPL-CCNC: 63 U/L (ref 46–116)
ALT SERPL W P-5'-P-CCNC: 28 U/L (ref 12–78)
ANION GAP SERPL CALCULATED.3IONS-SCNC: 9 MMOL/L (ref 4–13)
AST SERPL W P-5'-P-CCNC: 21 U/L (ref 5–45)
BACTERIA UR QL AUTO: ABNORMAL /HPF
BASOPHILS # BLD AUTO: 0.05 THOUSANDS/ÂΜL (ref 0–0.1)
BASOPHILS NFR BLD AUTO: 1 % (ref 0–1)
BILIRUB SERPL-MCNC: 0.35 MG/DL (ref 0.2–1)
BILIRUB UR QL STRIP: NEGATIVE
BUN SERPL-MCNC: 13 MG/DL (ref 5–25)
CALCIUM SERPL-MCNC: 8.8 MG/DL (ref 8.3–10.1)
CHLORIDE SERPL-SCNC: 103 MMOL/L (ref 96–108)
CLARITY UR: CLEAR
CO2 SERPL-SCNC: 27 MMOL/L (ref 21–32)
COLOR UR: YELLOW
CREAT SERPL-MCNC: 0.66 MG/DL (ref 0.6–1.3)
EOSINOPHIL # BLD AUTO: 0.12 THOUSAND/ÂΜL (ref 0–0.61)
EOSINOPHIL NFR BLD AUTO: 1 % (ref 0–6)
ERYTHROCYTE [DISTWIDTH] IN BLOOD BY AUTOMATED COUNT: 18.6 % (ref 11.6–15.1)
EXT PREG TEST URINE: NEGATIVE
EXT. CONTROL ED NAV: NORMAL
GFR SERPL CREATININE-BSD FRML MDRD: 118 ML/MIN/1.73SQ M
GLUCOSE SERPL-MCNC: 81 MG/DL (ref 65–140)
GLUCOSE UR STRIP-MCNC: NEGATIVE MG/DL
HCT VFR BLD AUTO: 30.9 % (ref 34.8–46.1)
HGB BLD-MCNC: 9.2 G/DL (ref 11.5–15.4)
HGB UR QL STRIP.AUTO: NEGATIVE
IMM GRANULOCYTES # BLD AUTO: 0.03 THOUSAND/UL (ref 0–0.2)
IMM GRANULOCYTES NFR BLD AUTO: 0 % (ref 0–2)
KETONES UR STRIP-MCNC: NEGATIVE MG/DL
LEUKOCYTE ESTERASE UR QL STRIP: ABNORMAL
LIPASE SERPL-CCNC: 179 U/L (ref 73–393)
LYMPHOCYTES # BLD AUTO: 2.62 THOUSANDS/ÂΜL (ref 0.6–4.47)
LYMPHOCYTES NFR BLD AUTO: 26 % (ref 14–44)
MCH RBC QN AUTO: 20 PG (ref 26.8–34.3)
MCHC RBC AUTO-ENTMCNC: 29.8 G/DL (ref 31.4–37.4)
MCV RBC AUTO: 67 FL (ref 82–98)
MONOCYTES # BLD AUTO: 0.86 THOUSAND/ÂΜL (ref 0.17–1.22)
MONOCYTES NFR BLD AUTO: 9 % (ref 4–12)
MUCOUS THREADS UR QL AUTO: ABNORMAL
NEUTROPHILS # BLD AUTO: 6.26 THOUSANDS/ÂΜL (ref 1.85–7.62)
NEUTS SEG NFR BLD AUTO: 63 % (ref 43–75)
NITRITE UR QL STRIP: NEGATIVE
NON-SQ EPI CELLS URNS QL MICRO: ABNORMAL /HPF
NRBC BLD AUTO-RTO: 0 /100 WBCS
PH UR STRIP.AUTO: 6 [PH]
PLATELET # BLD AUTO: 326 THOUSANDS/UL (ref 149–390)
PMV BLD AUTO: 8.4 FL (ref 8.9–12.7)
POTASSIUM SERPL-SCNC: 3.9 MMOL/L (ref 3.5–5.3)
PROT SERPL-MCNC: 7.5 G/DL (ref 6.4–8.4)
PROT UR STRIP-MCNC: NEGATIVE MG/DL
RBC # BLD AUTO: 4.59 MILLION/UL (ref 3.81–5.12)
RBC #/AREA URNS AUTO: ABNORMAL /HPF
SODIUM SERPL-SCNC: 139 MMOL/L (ref 135–147)
SP GR UR STRIP.AUTO: 1.02 (ref 1–1.03)
UROBILINOGEN UR QL STRIP.AUTO: 0.2 E.U./DL
WBC # BLD AUTO: 9.94 THOUSAND/UL (ref 4.31–10.16)
WBC #/AREA URNS AUTO: ABNORMAL /HPF

## 2022-09-04 PROCEDURE — 74177 CT ABD & PELVIS W/CONTRAST: CPT

## 2022-09-04 PROCEDURE — 36415 COLL VENOUS BLD VENIPUNCTURE: CPT | Performed by: SURGERY

## 2022-09-04 PROCEDURE — 80053 COMPREHEN METABOLIC PANEL: CPT | Performed by: SURGERY

## 2022-09-04 PROCEDURE — 85025 COMPLETE CBC W/AUTO DIFF WBC: CPT | Performed by: SURGERY

## 2022-09-04 PROCEDURE — 81025 URINE PREGNANCY TEST: CPT | Performed by: SURGERY

## 2022-09-04 PROCEDURE — 83690 ASSAY OF LIPASE: CPT | Performed by: SURGERY

## 2022-09-04 PROCEDURE — 81001 URINALYSIS AUTO W/SCOPE: CPT | Performed by: SURGERY

## 2022-09-04 RX ORDER — ONDANSETRON 4 MG/1
4 TABLET, ORALLY DISINTEGRATING ORAL EVERY 6 HOURS PRN
Qty: 20 TABLET | Refills: 0 | Status: SHIPPED | OUTPATIENT
Start: 2022-09-04

## 2022-09-04 RX ORDER — DICYCLOMINE HCL 20 MG
20 TABLET ORAL 2 TIMES DAILY
Qty: 20 TABLET | Refills: 0 | Status: SHIPPED | OUTPATIENT
Start: 2022-09-04

## 2022-09-04 RX ADMIN — IOHEXOL 70 ML: 350 INJECTION, SOLUTION INTRAVENOUS at 02:14
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
## 2022-09-04 NOTE — DISCHARGE INSTRUCTIONS
Ct revealed, "CT abdomen pelvis with contrast: Mild wall thickening of multiple fluid-filled small bowel loops suspicious for enteritis i e  infectious or inflammatory"    Follow up with primary care provider for this finding  Please return to the ED if you begin to experience any new or worsening symptoms, chest pain, shortness of breath, lightheadedness, dizziness, changes to vision, passing out, numbness, tingling, or weakness in the extremities, difficulty walking/swallowing/breathing, fevers, chills, nausea, vomiting or diarrhea 
0

## 2022-09-04 NOTE — ED NOTES
Pt concerned for mental health, requested to see crisis  Provider notified  [facility]othergill [facility]  09/04/22 2770

## 2022-09-21 ENCOUNTER — APPOINTMENT (OUTPATIENT)
Dept: RADIOLOGY | Facility: HOSPITAL | Age: 32
End: 2022-09-21
Payer: COMMERCIAL

## 2022-09-21 ENCOUNTER — HOSPITAL ENCOUNTER (EMERGENCY)
Facility: HOSPITAL | Age: 32
Discharge: HOME | End: 2022-09-21
Attending: EMERGENCY MEDICINE
Payer: COMMERCIAL

## 2022-09-21 VITALS
OXYGEN SATURATION: 100 % | SYSTOLIC BLOOD PRESSURE: 127 MMHG | HEART RATE: 62 BPM | DIASTOLIC BLOOD PRESSURE: 74 MMHG | RESPIRATION RATE: 14 BRPM

## 2022-09-21 DIAGNOSIS — R07.89 ATYPICAL CHEST PAIN: Primary | ICD-10-CM

## 2022-09-21 DIAGNOSIS — R51.9 HEADACHE: ICD-10-CM

## 2022-09-21 DIAGNOSIS — R10.9 ABDOMINAL PAIN: ICD-10-CM

## 2022-09-21 LAB
ALBUMIN SERPL BCP-MCNC: 3.6 G/DL (ref 3.5–5)
ALP SERPL-CCNC: 63 U/L (ref 46–116)
ALT SERPL W P-5'-P-CCNC: 32 U/L (ref 12–78)
ANION GAP SERPL CALCULATED.3IONS-SCNC: 8 MMOL/L (ref 4–13)
AST SERPL W P-5'-P-CCNC: 26 U/L (ref 5–45)
BASOPHILS # BLD AUTO: 0.08 THOUSANDS/ΜL (ref 0–0.1)
BASOPHILS NFR BLD AUTO: 1 % (ref 0–1)
BILIRUB SERPL-MCNC: 0.33 MG/DL (ref 0.2–1)
BILIRUB UR QL STRIP: NEGATIVE
BUN SERPL-MCNC: 7 MG/DL (ref 5–25)
CALCIUM SERPL-MCNC: 9.2 MG/DL (ref 8.3–10.1)
CARDIAC TROPONIN I PNL SERPL HS: <2 NG/L
CHLORIDE SERPL-SCNC: 104 MMOL/L (ref 96–108)
CLARITY UR: CLEAR
CO2 SERPL-SCNC: 28 MMOL/L (ref 21–32)
COLOR UR: YELLOW
CREAT SERPL-MCNC: 0.76 MG/DL (ref 0.6–1.3)
EOSINOPHIL # BLD AUTO: 0.12 THOUSAND/ΜL (ref 0–0.61)
EOSINOPHIL NFR BLD AUTO: 2 % (ref 0–6)
ERYTHROCYTE [DISTWIDTH] IN BLOOD BY AUTOMATED COUNT: 18.5 % (ref 11.6–15.1)
EXT PREG TEST URINE: NEGATIVE
EXT. CONTROL ED NAV: NORMAL
GFR SERPL CREATININE-BSD FRML MDRD: 104 ML/MIN/1.73SQ M
GLUCOSE SERPL-MCNC: 79 MG/DL (ref 65–140)
GLUCOSE UR STRIP-MCNC: NEGATIVE MG/DL
HCT VFR BLD AUTO: 33.5 % (ref 34.8–46.1)
HGB BLD-MCNC: 9.8 G/DL (ref 11.5–15.4)
HGB UR QL STRIP.AUTO: NEGATIVE
IMM GRANULOCYTES # BLD AUTO: 0.02 THOUSAND/UL (ref 0–0.2)
IMM GRANULOCYTES NFR BLD AUTO: 0 % (ref 0–2)
KETONES UR STRIP-MCNC: NEGATIVE MG/DL
LEUKOCYTE ESTERASE UR QL STRIP: NEGATIVE
LYMPHOCYTES # BLD AUTO: 2.35 THOUSANDS/ΜL (ref 0.6–4.47)
LYMPHOCYTES NFR BLD AUTO: 31 % (ref 14–44)
MCH RBC QN AUTO: 19.9 PG (ref 26.8–34.3)
MCHC RBC AUTO-ENTMCNC: 29.3 G/DL (ref 31.4–37.4)
MCV RBC AUTO: 68 FL (ref 82–98)
MONOCYTES # BLD AUTO: 0.68 THOUSAND/ΜL (ref 0.17–1.22)
MONOCYTES NFR BLD AUTO: 9 % (ref 4–12)
NEUTROPHILS # BLD AUTO: 4.26 THOUSANDS/ΜL (ref 1.85–7.62)
NEUTS SEG NFR BLD AUTO: 57 % (ref 43–75)
NITRITE UR QL STRIP: NEGATIVE
NRBC BLD AUTO-RTO: 0 /100 WBCS
PH UR STRIP.AUTO: 6 [PH]
PLATELET # BLD AUTO: 428 THOUSANDS/UL (ref 149–390)
PMV BLD AUTO: 9.6 FL (ref 8.9–12.7)
POTASSIUM SERPL-SCNC: 3.9 MMOL/L (ref 3.5–5.3)
PROT SERPL-MCNC: 7.7 G/DL (ref 6.4–8.4)
PROT UR STRIP-MCNC: NEGATIVE MG/DL
RBC # BLD AUTO: 4.92 MILLION/UL (ref 3.81–5.12)
SODIUM SERPL-SCNC: 140 MMOL/L (ref 135–147)
SP GR UR STRIP.AUTO: 1.01 (ref 1–1.03)
UROBILINOGEN UR QL STRIP.AUTO: 0.2 E.U./DL
WBC # BLD AUTO: 7.51 THOUSAND/UL (ref 4.31–10.16)

## 2022-09-21 PROCEDURE — 81025 URINE PREGNANCY TEST: CPT

## 2022-09-21 PROCEDURE — 71046 X-RAY EXAM CHEST 2 VIEWS: CPT

## 2022-09-21 PROCEDURE — 85025 COMPLETE CBC W/AUTO DIFF WBC: CPT

## 2022-09-21 PROCEDURE — 96374 THER/PROPH/DIAG INJ IV PUSH: CPT

## 2022-09-21 PROCEDURE — 80053 COMPREHEN METABOLIC PANEL: CPT

## 2022-09-21 PROCEDURE — 96361 HYDRATE IV INFUSION ADD-ON: CPT

## 2022-09-21 PROCEDURE — 99285 EMERGENCY DEPT VISIT HI MDM: CPT

## 2022-09-21 PROCEDURE — 36415 COLL VENOUS BLD VENIPUNCTURE: CPT

## 2022-09-21 PROCEDURE — 81003 URINALYSIS AUTO W/O SCOPE: CPT

## 2022-09-21 PROCEDURE — 84484 ASSAY OF TROPONIN QUANT: CPT

## 2022-09-21 PROCEDURE — 93005 ELECTROCARDIOGRAM TRACING: CPT

## 2022-09-21 RX ORDER — FAMOTIDINE 20 MG/1
20 TABLET, FILM COATED ORAL ONCE
Status: COMPLETED | OUTPATIENT
Start: 2022-09-21 | End: 2022-09-21

## 2022-09-21 RX ORDER — MAGNESIUM HYDROXIDE/ALUMINUM HYDROXICE/SIMETHICONE 120; 1200; 1200 MG/30ML; MG/30ML; MG/30ML
30 SUSPENSION ORAL ONCE
Status: COMPLETED | OUTPATIENT
Start: 2022-09-21 | End: 2022-09-21

## 2022-09-21 RX ORDER — KETOROLAC TROMETHAMINE 30 MG/ML
15 INJECTION, SOLUTION INTRAMUSCULAR; INTRAVENOUS ONCE
Status: COMPLETED | OUTPATIENT
Start: 2022-09-21 | End: 2022-09-21

## 2022-09-21 RX ADMIN — FAMOTIDINE 20 MG: 20 TABLET ORAL at 15:07

## 2022-09-21 RX ADMIN — SODIUM CHLORIDE 1000 ML: 0.9 INJECTION, SOLUTION INTRAVENOUS at 15:06

## 2022-09-21 RX ADMIN — KETOROLAC TROMETHAMINE 15 MG: 30 INJECTION, SOLUTION INTRAMUSCULAR at 15:06

## 2022-09-21 RX ADMIN — ALUMINUM HYDROXIDE, MAGNESIUM HYDROXIDE, AND SIMETHICONE 30 ML: 200; 200; 20 SUSPENSION ORAL at 15:03

## 2022-09-21 NOTE — ED PROVIDER NOTES
History  Chief Complaint   Patient presents with    Weakness - Generalized     PT arrived VIA EMS from [address]  PT called because she says she was not feeling well  C/o of chest pains on phone but when EMS arrived denied chest pains along with now denying chest pains  PT recently admitted to [facility] and did not want to go back there  Patient is a 22-year-old female with a past medical history of schizoaffective disorder and iron deficiency anemia presenting to the emergency department for evaluation of chest pain, abdominal pain and headache  Upon walking into the room the patient was requesting food  Reports the abdominal pain and chest pain began yesterday continuing today  [place] EMS for chest pain and abdominal pain  Reports when EMS arrived she denied having chest pain  Reports chest pain has returned and is substernal with a burning sensation  Reports having a cookie today, along with cookies, pretzels, chocolate chips last night  Reports having suprapubic pain upon arrival  Reports having a headache on the left side of her head  Denies fevers, chills, rash, weakness, dizziness, visual changes, nausea, vomiting, diarrhea, constipation, shortness of breath or difficulty breathing  Does not offer any other concerns or complaints        History provided by:  Patient   used: No    Chest Pain  Pain location:  Substernal area  Pain quality: burning    Pain radiates to the back: no    Onset quality:  Sudden  Duration:  1 day  Timing:  Intermittent  Progression:  Waxing and waning  Relieved by:  None tried  Worsened by:  Nothing tried  Ineffective treatments:  None tried  Associated symptoms: abdominal pain and headache    Associated symptoms: no altered mental status, no anorexia, no anxiety, no back pain, no claudication, no cough, no diaphoresis, no dizziness, no dysphagia, no fatigue, no fever, no heartburn, no lower extremity edema, no nausea, no near-syncope, no numbness, no orthopnea, no palpitations, no PND, no shortness of breath, no syncope, not vomiting and no weakness    Abdominal pain:     Location:  Suprapubic    Quality:  Aching    Duration:  1 day      Prior to Admission Medications   Prescriptions Last Dose Informant Patient Reported? Taking?   dicyclomine (BENTYL) 20 mg tablet   No No   Sig: Take 1 tablet (20 mg total) by mouth 2 (two) times a day   ketotifen (ZADITOR) 0 025 % ophthalmic solution   No No   Sig: Administer 1 drop to both eyes 2 (two) times a day for 7 days   ondansetron (Zofran ODT) 4 mg disintegrating tablet   No No   Sig: Take 1 tablet (4 mg total) by mouth every 6 (six) hours as needed for nausea or vomiting      Facility-Administered Medications: None       Past Medical History:   Diagnosis Date    [facility]        No past surgical history on file  No family history on file  I have reviewed and agree with the history as documented  E-Cigarette/Vaping    E-Cigarette Use Never User      E-Cigarette/Vaping Substances    Nicotine No     THC No     CBD No     Flavoring No     Other No     Unknown No      Social History     Tobacco Use    Smoking status: Never Smoker    Smokeless tobacco: Never Used    Tobacco comment: Pt reports having smoked a pack of cigarettes yesterday   Vaping Use    Vaping Use: Never used   Substance Use Topics    Alcohol use: Never     Comment: socially    Drug use: Yes     Types: Marijuana       Review of Systems   Constitutional: Negative for diaphoresis, fatigue and fever  HENT: Negative for trouble swallowing  Respiratory: Negative for cough and shortness of breath  Cardiovascular: Positive for chest pain  Negative for palpitations, orthopnea, claudication, syncope, PND and near-syncope  Gastrointestinal: Positive for abdominal pain  Negative for anorexia, heartburn, nausea and vomiting     Musculoskeletal: Negative for back pain    Neurological: Positive for headaches  Negative for dizziness, weakness and numbness  Physical Exam  Physical Exam  Vitals and nursing note reviewed  Constitutional:       Appearance: Normal appearance  HENT:      Head: Normocephalic and atraumatic  Right Ear: External ear normal       Left Ear: External ear normal       Nose: Nose normal       Mouth/Throat:      Mouth: Mucous membranes are moist    Eyes:      General: No scleral icterus  Right eye: No discharge  Left eye: No discharge  Conjunctiva/sclera: Conjunctivae normal    Cardiovascular:      Rate and Rhythm: Normal rate  Pulmonary:      Effort: Pulmonary effort is normal  No respiratory distress  Abdominal:      General: Bowel sounds are normal  There is no distension  Palpations: Abdomen is soft  Tenderness: There is no abdominal tenderness  There is no guarding  Musculoskeletal:         General: No swelling, deformity or signs of injury  Normal range of motion  Cervical back: Normal range of motion and neck supple  No rigidity  Skin:     General: Skin is warm and dry  Coloration: Skin is not jaundiced  Findings: No erythema or rash  Neurological:      General: No focal deficit present  Mental Status: She is alert and oriented to person, place, and time  Mental status is at baseline  Cranial Nerves: No cranial nerve deficit  Gait: Gait normal    Psychiatric:         Mood and Affect: Mood normal          Behavior: Behavior normal          Thought Content:  Thought content normal          Judgment: Judgment normal          Vital Signs  ED Triage Vitals   Temp Pulse Respirations Blood Pressure SpO2   -- 09/21/22 1400 09/21/22 1400 09/21/22 1400 09/21/22 1400    77 14 126/69 100 %      Temp src Heart Rate Source Patient Position - Orthostatic VS BP Location FiO2 (%)   -- 09/21/22 1400 09/21/22 1400 09/21/22 1400 --    Monitor Lying Right arm       Pain Score 09/21/22 1441       10 - Worst Possible Pain           Vitals:    09/21/22 1400 09/21/22 1600   BP: 126/69 127/74   Pulse: 77 62   Patient Position - Orthostatic VS: Lying Lying         Visual Acuity  Visual Acuity    Flowsheet Row Most Recent Value   L Pupil Size (mm) 5   R Pupil Size (mm) 5          ED Medications  Medications   ketorolac (TORADOL) injection 15 mg (15 mg Intravenous Given 9/21/22 1506)   sodium chloride 0 9 % bolus 1,000 mL (0 mL Intravenous Stopped 9/21/22 1606)   famotidine (PEPCID) tablet 20 mg (20 mg Oral Given 9/21/22 1507)   aluminum-magnesium hydroxide-simethicone (MYLANTA) oral suspension 30 mL (30 mL Oral Given 9/21/22 1503)       Diagnostic Studies  Results Reviewed     Procedure Component Value Units Date/Time    POCT pregnancy, urine [[id number]]  (Normal) Resulted: 09/21/22 1629    Lab Status: Final result Updated: 09/21/22 1629     EXT PREG TEST UR (Ref: Negative) negative     Control valid    UA w Reflex to Microscopic w Reflex to Culture [[id number]] Collected: 09/21/22 1520    Lab Status: Final result Specimen: Urine, Clean Catch Updated: 09/21/22 1535     Color, UA Yellow     Clarity, UA Clear     Specific Gravity, UA 1 015     pH, UA 6 0     Leukocytes, UA Negative     Nitrite, UA Negative     Protein, UA Negative mg/dl      Glucose, UA Negative mg/dl      Ketones, UA Negative mg/dl      Urobilinogen, UA 0 2 E U /dl      Bilirubin, UA Negative     Occult Blood, UA Negative    HS Troponin 0hr (reflex protocol) [[id number]]  (Normal) Collected: 09/21/22 1501    Lab Status: Final result Specimen: Blood from Arm, Left Updated: 09/21/22 1530     hs TnI 0hr <2 ng/L     Comprehensive metabolic panel [[id number]] Collected: 09/21/22 1501    Lab Status: Final result Specimen: Blood from Arm, Left Updated: 09/21/22 1530     Sodium 140 mmol/L      Potassium 3 9 mmol/L      Chloride 104 mmol/L      CO2 28 mmol/L      ANION GAP 8 mmol/L      BUN 7 mg/dL      Creatinine 0 76 mg/dL      Glucose 79 mg/dL      Calcium 9 2 mg/dL      AST 26 U/L      ALT 32 U/L      Alkaline Phosphatase 63 U/L      Total Protein 7 7 g/dL      Albumin 3 6 g/dL      Total Bilirubin 0 33 mg/dL      eGFR 104 ml/min/1 73sq m     Narrative:      [place] guidelines for Chronic Kidney Disease (CKD):     Stage 1 with normal or high GFR (GFR > 90 mL/min/1 73 square meters)    Stage 2 Mild CKD (GFR = 60-89 mL/min/1 73 square meters)    Stage 3A Moderate CKD (GFR = 45-59 mL/min/1 73 square meters)    Stage 3B Moderate CKD (GFR = 30-44 mL/min/1 73 square meters)    Stage 4 Severe CKD (GFR = 15-29 mL/min/1 73 square meters)    Stage 5 End Stage CKD (GFR <15 mL/min/1 73 square meters)  Note: GFR calculation is accurate only with a steady state creatinine    CBC and differential [[id number]]  (Abnormal) Collected: 09/21/22 1501    Lab Status: Final result Specimen: Blood from Arm, Left Updated: 09/21/22 1507     WBC 7 51 Thousand/uL      RBC 4 92 Million/uL      Hemoglobin 9 8 g/dL      Hematocrit 33 5 %      MCV 68 fL      MCH 19 9 pg      MCHC 29 3 g/dL      RDW 18 5 %      MPV 9 6 fL      Platelets 723 Thousands/uL      nRBC 0 /100 WBCs      Neutrophils Relative 57 %      Immat GRANS % 0 %      Lymphocytes Relative 31 %      Monocytes Relative 9 %      Eosinophils Relative 2 %      Basophils Relative 1 %      Neutrophils Absolute 4 26 Thousands/µL      Immature Grans Absolute 0 02 Thousand/uL      Lymphocytes Absolute 2 35 Thousands/µL      Monocytes Absolute 0 68 Thousand/µL      Eosinophils Absolute 0 12 Thousand/µL      Basophils Absolute 0 08 Thousands/µL                  XR chest 2 views   Final Result by Rachel Staley MD (09/21 5822)      No acute cardiopulmonary disease                    Workstation performed: [id number]                    Procedures  ECG 12 Lead Documentation Only    Date/Time: 9/21/2022 2:48 PM  Performed by: Alisson Vickers PA-C  Authorized by: Alisson Vickers PA-C Indications / Diagnosis:  Chest pain  ECG reviewed by me, the ED Provider: yes    Patient location:  ED  Previous ECG:     Previous ECG:  Compared to current    Comparison ECG info:  05/23/22    Similarity:  Changes noted (ST elevation in lateral leads)  Interpretation:     Interpretation: normal    Rate:     ECG rate:  65    ECG rate assessment: normal    Rhythm:     Rhythm: sinus rhythm    Ectopy:     Ectopy: none    QRS:     QRS axis:  Normal    QRS intervals:  Normal  Conduction:     Conduction: normal    ST segments:     ST segments:  Normal  T waves:     T waves: normal               ED Course  ED Course as of 09/21/22 2121   Wed Sep 21, 2022   1524 Reports her headache and abdominal pain have resolved  [id number] Reports she is only having slight chest pain now  Refuses a 2 hour troponin, reports she would like to go home  Discontinuing 2 hour and 4 hour troponin             HEART Risk Score    Flowsheet Row Most Recent Value   Heart Score Risk Calculator    History 1 Filed at: 09/21/2022 1407   ECG 1 Filed at: 09/21/2022 1407   Age 0 Filed at: 09/21/2022 1407   Risk Factors 0 Filed at: 09/21/2022 1407   Troponin 0 Filed at: 09/21/2022 1407   HEART Score 2 Filed at: 09/21/2022 1407                        SBIRT 22yo+    Flowsheet Row Most Recent Value   SBIRT (25 yo +)    In order to provide better care to our patients, we are screening all of our patients for alcohol and drug use  Would it be okay to ask you these screening questions? Yes Filed at: 09/21/2022 1437   Initial Alcohol Screen: US AUDIT-C     1  How often do you have a drink containing alcohol? 0 Filed at: 09/21/2022 1437   2  How many drinks containing alcohol do you have on a typical day you are drinking? 0 Filed at: 09/21/2022 1437   3b  FEMALE Any Age, or MALE 65+: How often do you have 4 or more drinks on one occassion?  0 Filed at: 09/21/2022 1437   Audit-C Score 0 Filed at: 09/21/2022 1437   ARASH: How many times in the past year have you  Used an illegal drug or used a prescription medication for non-medical reasons? Never Filed at: 09/21/2022 1437                    [facility]  Number of Diagnoses or Management Options  Abdominal pain: new and requires workup  Atypical chest pain: new and requires workup  Headache: new and requires workup  Diagnosis management comments: This is a 32year old female presenting to the ED for evaluation of chest pain, abdominal pain and headache  Reports she was at the [facility] when she called EMS for chest pain  She reports that when EMS arrived she denied chest pain  Reports her chest pain came back upon evaluation as well as abdominal pain and headache  Reports she had a cookie this morning and that did not worsen the abdominal pain  Patient is requesting food during examination  Reports the chest pain is substernal and burning       Differential diagnosis to include but is not limited to: STEMI, ACS, GERD, UTI, migraine     Initial ED Plan: CBC, CMP, troponin, UA, urine pregnancy, CXR, EKG  -toradol, fluids     ED results:  No acute cardiopulmonary disease  0 hour troponin: <2  Heart score: 2  - patient is refusing a 2 hour troponin dispite of having continued slight substernal chest pain  Reports she would like to go home      Final ED assessment: Patient is stable and well appearing  Discussed radiologic studies and laboratory results  Discussed follow-up with PCP  Strict return precautions were discussed including but not limited to chest pain, difficulty breathing, shortness of breath, abdominal pain, headaches, dizziness, fevers, chills, weakness  Patient verbalized understanding and is agreeable with the plan for discharge          Amount and/or Complexity of Data Reviewed  Clinical lab tests: ordered and reviewed  Tests in the radiology section of CPT®: ordered and reviewed  Independent visualization of images, tracings, or specimens: yes        Disposition  Final diagnoses:   Atypical chest pain Abdominal pain   Headache     Time reflects when diagnosis was documented in both MDM as applicable and the Disposition within this note     Time User Action Codes Description Comment    9/21/2022  4:28 PM Andra Bustos Add [R07 89] Atypical chest pain     9/21/2022  4:28 PM Andra Bustos Add [R10 9] Abdominal pain     9/21/2022  4:28 PM Andra Bustos Add [R51 9] Headache       ED Disposition     ED Disposition   Discharge    Condition   Stable    Date/Time   Wed Sep 21, 2022  4:28 PM    Comment   Brianna Hendricks discharge to home/self care  Follow-up Information     Follow up With Specialties Details Why Contact Info Additional [facility], [address] [place] [address] Internal Medicine Call in 3 days For follow up [id number] [facility] [phone]       [facility] Emergency Department Emergency Medicine Go to  If symptoms worsen [id number] [facility]  [id number] [place] [address] Emergency Department, [address] [place] [address], Replaced by [facility]          Discharge Medication List as of 9/21/2022  4:29 PM      CONTINUE these medications which have NOT CHANGED    Details   dicyclomine (BENTYL) 20 mg tablet Take 1 tablet (20 mg total) by mouth 2 (two) times a day, Starting Sun 9/4/2022, Normal      ketotifen (ZADITOR) 0 025 % ophthalmic solution Administer 1 drop to both eyes 2 (two) times a day for 7 days, Starting Fri 8/26/2022, Until Fri 9/2/2022, Print      ondansetron (Zofran ODT) 4 mg disintegrating tablet Take 1 tablet (4 mg total) by mouth every 6 (six) hours as needed for nausea or vomiting, Starting Sun 9/4/2022, Normal             No discharge procedures on file      PDMP Review     None          ED Provider  Electronically Signed by           Tiffany Wheatley PA-C  09/21/22 8524

## 2022-09-21 NOTE — DISCHARGE INSTRUCTIONS
Follow up with PCP  Tylenol/motrin as needed for pain  Return to the ED with new or worsening symptoms including but not limited to worsening pain, shortness of breath, chest pain, dizziness, weakness

## 2022-09-23 LAB
ATRIAL RATE: 65 BPM
P AXIS: 37 DEGREES
PR INTERVAL: 136 MS
QRS AXIS: 75 DEGREES
QRSD INTERVAL: 84 MS
QT INTERVAL: 402 MS
QTC INTERVAL: 418 MS
T WAVE AXIS: 22 DEGREES
VENTRICULAR RATE: 65 BPM

## 2022-09-23 PROCEDURE — 93010 ELECTROCARDIOGRAM REPORT: CPT | Performed by: INTERNAL MEDICINE

## 2022-10-10 ENCOUNTER — HOSPITAL ENCOUNTER (EMERGENCY)
Facility: HOSPITAL | Age: 32
Discharge: HOME | End: 2022-10-10
Attending: EMERGENCY MEDICINE
Payer: COMMERCIAL

## 2022-10-10 VITALS
OXYGEN SATURATION: 100 % | SYSTOLIC BLOOD PRESSURE: 102 MMHG | RESPIRATION RATE: 14 BRPM | DIASTOLIC BLOOD PRESSURE: 56 MMHG | HEART RATE: 81 BPM | TEMPERATURE: 98.9 F

## 2022-10-10 DIAGNOSIS — R44.0 AUDITORY HALLUCINATIONS: ICD-10-CM

## 2022-10-10 DIAGNOSIS — R52 GENERALIZED BODY ACHES: Primary | ICD-10-CM

## 2022-10-10 LAB
AMPHETAMINES SERPL QL SCN: NEGATIVE
BARBITURATES UR QL: NEGATIVE
BENZODIAZ UR QL: NEGATIVE
COCAINE UR QL: NEGATIVE
ETHANOL EXG-MCNC: 0 MG/DL
EXT PREG TEST URINE: NEGATIVE
EXT. CONTROL ED NAV: NORMAL
METHADONE UR QL: NEGATIVE
OPIATES UR QL SCN: NEGATIVE
OXYCODONE+OXYMORPHONE UR QL SCN: NEGATIVE
PCP UR QL: NEGATIVE
THC UR QL: POSITIVE

## 2022-10-10 PROCEDURE — 82075 ASSAY OF BREATH ETHANOL: CPT | Performed by: PHYSICIAN ASSISTANT

## 2022-10-10 PROCEDURE — 99284 EMERGENCY DEPT VISIT MOD MDM: CPT | Performed by: PHYSICIAN ASSISTANT

## 2022-10-10 PROCEDURE — 81025 URINE PREGNANCY TEST: CPT | Performed by: PHYSICIAN ASSISTANT

## 2022-10-10 PROCEDURE — 99284 EMERGENCY DEPT VISIT MOD MDM: CPT

## 2022-10-10 PROCEDURE — 80307 DRUG TEST PRSMV CHEM ANLYZR: CPT | Performed by: PHYSICIAN ASSISTANT

## 2022-10-10 RX ORDER — ACETAMINOPHEN 325 MG/1
975 TABLET ORAL ONCE
Status: COMPLETED | OUTPATIENT
Start: 2022-10-10 | End: 2022-10-10

## 2022-10-10 RX ORDER — ACETAMINOPHEN 325 MG/1
650 TABLET ORAL ONCE
Status: COMPLETED | OUTPATIENT
Start: 2022-10-10 | End: 2022-10-10

## 2022-10-10 RX ORDER — IBUPROFEN 600 MG/1
600 TABLET ORAL ONCE
Status: COMPLETED | OUTPATIENT
Start: 2022-10-10 | End: 2022-10-10

## 2022-10-10 RX ADMIN — ACETAMINOPHEN 975 MG: 325 TABLET ORAL at 10:30

## 2022-10-10 RX ADMIN — IBUPROFEN 600 MG: 600 TABLET ORAL at 10:31

## 2022-10-10 RX ADMIN — ACETAMINOPHEN 650 MG: 325 TABLET, FILM COATED ORAL at 04:44

## 2022-10-10 NOTE — ED NOTES
Pt ambulatory to the restroom with a steady gait and without assistance      Laura Mi RN  10/10/22 [id number]

## 2022-10-10 NOTE — ED NOTES
Call placed to [facility] [place] @ [phone]  Spoke with Jaden Sterling who indicated that she has met the patient before, but that the patient has not been to them in months   Jaden Sterling stated that he patient is a client and can utilize their shower on M, W, F and last shower is at [address] is on T and Th, last wash is at 1230pm     Hours - 10am-2pm     Ema Berkowitz LMSW  10/10/22  [id number]

## 2022-10-10 NOTE — ED NOTES
Pt resting comfortably with audible snoring and no signs of distress      Eitan Garcia RN  10/10/22 9853

## 2022-10-10 NOTE — DISCHARGE INSTRUCTIONS
This writer discussed the patients current presentation and recommended discharge plan with PA, Trenton Baumgarten  They agree with the patient being discharged at this time with referrals and/or information about outpatient [place] [address] resources  The patient was Instructed to follow up with their PCP  The patient was provided with referral information for out[address] [employer], as well as the local emergency [facility]  Patient would benefit from a Targeted  through [facility]ON[facility] Smyth  This writer and the patient completed a safety plan  The patient was provided with a copy of their safety plan with encouragement to utilize the plan following discharge  In addition, the patient was instructed to call local [facility], other crisis services, 911 or to go to the nearest ER immediately if their situation changes at any time  This writer discussed discharge plans with the patient who agrees with and understands the discharge plans  SAFETY PLAN  Warning Signs (thoughts, images, mood, behavior, situations) of a potential crisis: Increased anxiety  Coping Skills (what can I do to take my mind off the problem, or to keep myself safe): Contacting [employer] or Warm Line  Outside Support (who can I reach out to for support and help):  Outpt [facility], [employer], as well as the local emergency shelter list      National Suicide Prevention Hotline:  [address] 7-436-387-837-982-2701 - LVF Crisis/Mobile Crisis   [address] [facility]: [facility]: [address] [phone] - Crisis   [phone] - Peer Support Talk Line (1-9pm daily)  [phone] - [address] (1-9pm daily)  [phone] [phone] [facility] [address] [facility] [address] [place] ([place]) [phone] - [address] [facility]

## 2022-10-10 NOTE — ED NOTES
Pt is a 32 y o  female who presented to the ED due to increased anxiety, and auditory and visual hallucinations  Patient reports that she was “very cold last night"  Patient admits that she has been homeless for approximately 7 years, and has no support  Patient reports 2 previous inpatient mental health admissions at [facility], although states that she is not currently linked with any outpatient mental health providers  Patient does state that she is currently on medications although was unable to identify who prescribes them  Patient denies any chronic medical concerns other than body aches and stomach problems  Patient also denies any legal concerns or substance use, although does smoke cigarettes  Patient denies feeling depressed, although reports "ongoing anxiety over some of the medication she is taking, food that she eats and clothes that she wears"  Patient does report auditory hallucinations of hearing different voices, but states that these voices have been present for several years  Patient also admits to visual hallucinations of seeing things that occur in the future, as well as “people"  Patient adamantly denies wanting to harm herself, but does report that she has had suicidal thoughts in the past many weeks ago, and has had self-injurious behaviors by hitting herself when she is mad  Patient also denies any suicide attempts in the past, as well as homicidal thoughts or violence towards others, other than when provoked  Discussed treatment options with the patient who is requesting a shelter list, and indicated how difficult it has been for her to obtain shelter at [facility] or through [employer]  Patient states that she lost all of her identification  Chief Complaint   Patient presents with   • Generalized Body Aches     Pt came in by EMS d/t generalized body aches   She states that she "doesn't feel well in her entire body, especially feet because she was outside in the cold tonight"  She is mainly here looking for resources d/t being homeless  She is hearing voices but they do not make sense  She stated that they aren't telling her to hurt herself or others but they made her want to punch a wall earlier  She picked [facility] med[facility], Perphenazine yesterday and took them yesterday but not today  Intake Assessment completed, Safety Risk Assessment completed      Corrine Ann LMSW  10/10/22  1714

## 2022-10-10 NOTE — ED PROVIDER NOTES
History  Chief Complaint   Patient presents with   • Generalized Body Aches     Pt came in by EMS d/t generalized body aches  She states that she "doesn't feel well in her entire body, especially feet because she was outside in the cold tonight"  She is mainly here looking for resources d/t being homeless  She is hearing voices but they do not make sense  She stated that they aren't telling her to hurt herself or others but they made her want to punch a wall earlier  She picked up new meds from [facility], Perphenazine yesterday and took them yesterday but not today  Patient is a 59-year-old female with a past medical history significant for schizoaffective disorder presenting to the emergency department for evaluation of lower back pain  Symptoms started approximately 12 hours ago  She denies any injury or trauma to the area  She is not having any lower extremity weakness, urinary retention, urinary/bowel incontinence  No saddle anesthesia  Patient also requesting to speak with crisis  She states that she has been homeless for the last 7 years, she would like resources  She also reports auditory hallucinations  She states that she hears multiple voices all the time  She is unable to understand what they are saying because there are so many voices speaking at the same time  She is denying any suicidal or homicidal ideations  She states that she is supposed to take Seroquel but has not been compliant with her medications  She would like to sign a 201 for inpatient psychiatric care  She is not having any other complaints at this time  Prior to Admission Medications   Prescriptions Last Dose Informant Patient Reported?  Taking?   dicyclomine (BENTYL) 20 mg tablet   No No   Sig: Take 1 tablet (20 mg total) by mouth 2 (two) times a day   ketotifen (ZADITOR) 0 025 % ophthalmic solution   No No   Sig: Administer 1 drop to both eyes 2 (two) times a day for 7 days   ondansetron (Zofran ODT) 4 mg disintegrating tablet   No No   Sig: Take 1 tablet (4 mg total) by mouth every 6 (six) hours as needed for nausea or vomiting      Facility-Administered Medications: None       Past Medical History:   Diagnosis Date   • Addiction to drug ([facility] [address] )        No past surgical history on file  No family history on file  I have reviewed and agree with the history as documented  E-Cigarette/Vaping   • E-Cigarette Use Never User      E-Cigarette/Vaping Substances   • Nicotine No    • THC No    • CBD No    • Flavoring No    • Other No    • Unknown No      Social History     Tobacco Use   • Smoking status: Never Smoker   • Smokeless tobacco: Never Used   • Tobacco comment: Pt reports having smoked a pack of cigarettes yesterday   Vaping Use   • Vaping Use: Never used   Substance Use Topics   • Alcohol use: Never     Comment: socially   • Drug use: Yes     Types: Marijuana       Review of Systems   Constitutional: Negative for chills and fever  HENT: Negative for congestion, facial swelling, sore throat and voice change  Eyes: Negative for pain and redness  Respiratory: Negative for cough, choking, chest tightness, shortness of breath and stridor  Cardiovascular: Negative for chest pain and palpitations  Gastrointestinal: Negative for abdominal pain, diarrhea, nausea and vomiting  Genitourinary: Negative for decreased urine volume, difficulty urinating, dysuria, frequency and urgency  Musculoskeletal: Positive for back pain  Negative for arthralgias, myalgias, neck pain and neck stiffness  Skin: Negative for color change and rash  Neurological: Negative for dizziness, syncope, facial asymmetry, weakness, light-headedness, numbness and headaches  Psychiatric/Behavioral: Positive for hallucinations (Auditory)  Negative for confusion, self-injury and suicidal ideas  The patient is not nervous/anxious  All other systems reviewed and are negative  Physical Exam  Physical Exam  Vitals reviewed  Constitutional:       General: She is not in acute distress  Appearance: Normal appearance  She is not ill-appearing, toxic-appearing or diaphoretic  HENT:      Head: Normocephalic and atraumatic  Right Ear: External ear normal       Left Ear: External ear normal    Eyes:      General: No scleral icterus  Right eye: No discharge  Left eye: No discharge  Extraocular Movements: Extraocular movements intact  Conjunctiva/sclera: Conjunctivae normal    Cardiovascular:      Rate and Rhythm: Normal rate and regular rhythm  Pulses: Normal pulses  Heart sounds: Normal heart sounds  No murmur heard  No friction rub  No gallop  Pulmonary:      Effort: Pulmonary effort is normal  No respiratory distress  Breath sounds: Normal breath sounds  No stridor  No wheezing, rhonchi or rales  Abdominal:      General: Abdomen is flat  Palpations: Abdomen is soft  Tenderness: There is no abdominal tenderness  There is no guarding or rebound  Musculoskeletal:      Cervical back: Normal range of motion and neck supple  Lumbar back: Tenderness (Bilateral paraspinal muscles) present  No bony tenderness  Negative right straight leg raise test and negative left straight leg raise test       Right lower leg: No edema  Left lower leg: No edema  Comments: No midline tenderness, step-offs, deformities to the entirety of the spinal column  Skin:     General: Skin is warm and dry  Capillary Refill: Capillary refill takes less than 2 seconds  Neurological:      General: No focal deficit present  Mental Status: She is alert and oriented to person, place, and time  Psychiatric:         Attention and Perception: She perceives auditory hallucinations  She does not perceive visual hallucinations  Mood and Affect: Mood normal  Affect is flat  Speech: Speech normal          Behavior: Behavior normal  Behavior is cooperative           Thought Content: Thought content does not include homicidal or suicidal ideation  Thought content does not include homicidal or suicidal plan  Vital Signs  ED Triage Vitals   Temperature Pulse Respirations Blood Pressure SpO2   10/10/22 0241 10/10/22 0241 10/10/22 0241 10/10/22 0241 10/10/22 0241   97 7 °F (36 5 °C) 71 18 114/82 95 %      Temp Source Heart Rate Source Patient Position - Orthostatic VS BP Location FiO2 (%)   10/10/22 0241 10/10/22 0241 10/10/22 0241 10/10/22 0241 --   Oral Monitor Sitting Right arm       Pain Score       10/10/22 0444       10 - Worst Possible Pain           Vitals:    10/10/22 0241 10/10/22 0922   BP: 114/82 102/56   Pulse: 71 81   Patient Position - Orthostatic VS: Sitting Lying         Visual Acuity      ED Medications  Medications   acetaminophen (TYLENOL) tablet 650 mg (650 mg Oral Given 10/10/22 0444)   acetaminophen (TYLENOL) tablet 975 mg (975 mg Oral Given 10/10/22 1030)   ibuprofen (MOTRIN) tablet 600 mg (600 mg Oral Given 10/10/22 1031)       Diagnostic Studies  Results Reviewed     Procedure Component Value Units Date/Time    POCT pregnancy, urine [[id number]]  (Normal) Resulted: 10/10/22 1018    Lab Status: Final result Updated: 10/10/22 1019     EXT PREG TEST UR (Ref: Negative) Negative     Control Valid    Rapid drug screen, urine [[id number]]  (Abnormal) Collected: 10/10/22 0944    Lab Status: Final result Specimen: Urine, Clean Catch Updated: 10/10/22 1010     Amph/Meth UR Negative     Barbiturate Ur Negative     Benzodiazepine Urine Negative     Cocaine Urine Negative     Methadone Urine Negative     Opiate Urine Negative     PCP Ur Negative     THC Urine Positive     Oxycodone Urine Negative    Narrative:      Presumptive report  If requested, specimen will be sent to reference lab for confirmation  FOR MEDICAL PURPOSES ONLY  IF CONFIRMATION NEEDED PLEASE CONTACT THE LAB WITHIN 5 DAYS      Drug Screen Cutoff Levels:  AMPHETAMINE/METHAMPHETAMINES  1000 ng/mL  BARBITURATES     200 ng/mL  BENZODIAZEPINES     200 ng/mL  COCAINE      300 ng/mL  METHADONE      300 ng/mL  OPIATES      300 ng/mL  PHENCYCLIDINE     25 ng/mL  THC       50 ng/mL  OXYCODONE      100 ng/mL    POCT alcohol breath test [[id number]]  (Normal) Resulted: 10/10/22 0441    Lab Status: Final result Updated: 10/10/22 0441     EXTBreath Alcohol 0 000                 No orders to display              Procedures  Procedures         ED Course  ED Course as of 10/12/22 0021   Mon Oct 10, 2022   7193 Patient medically cleared for crisis consult and psychiatric care  SBIRT 20yo+    Flowsheet Row Most Recent Value   SBIRT (25 yo +)    In order to provide better care to our patients, we are screening all of our patients for alcohol and drug use  Would it be okay to ask you these screening questions? No Filed at: 10/10/2022 0250                    MDM  Number of Diagnoses or Management Options  Auditory hallucinations  Generalized body aches  Diagnosis management comments: Patient presenting for evaluation of bilateral lower back pain without signs of cauda equina  She was also reporting auditory hallucinations, homelessness  No homicidal or suicidal ideations  Patient calm, cooperative  She has mild bilateral lumbar paraspinal muscle tenderness  Resolved with Tylenol  Requesting crisis consult for outpatient resources she  Patient was evaluated by crisis, ultimately discharged with resources for multiple local homeless shelters  Currently in stable condition      Risk of Complications, Morbidity, and/or Mortality  Presenting problems: low  Diagnostic procedures: low  Management options: low    Patient Progress  Patient progress: stable      Disposition  Final diagnoses:   Generalized body aches   Auditory hallucinations     Time reflects when diagnosis was documented in both MDM as applicable and the Disposition within this note     Time User Action Codes Description Comment 10/10/2022 12:39 PM Ashley Wells Add [R52] Generalized body aches     10/10/2022 12:39 PM Ashley Wells Add [R44 0] Auditory hallucinations       ED Disposition     ED Disposition   Discharge    Condition   Stable    Date/Time   Mon Oct 10, 2022 12:39 PM    Comment   Alfonso Legacy discharge to home/self care  MD Documentation    Simon García Most Recent Value   Sending MD Dr Adebayo Rivera    None         Discharge Medication List as of 10/10/2022 12:40 PM      CONTINUE these medications which have NOT CHANGED    Details   dicyclomine (BENTYL) 20 mg tablet Take 1 tablet (20 mg total) by mouth 2 (two) times a day, Starting Sun 9/4/2022, Normal      ketotifen (ZADITOR) 0 025 % ophthalmic solution Administer 1 drop to both eyes 2 (two) times a day for 7 days, Starting Fri 8/26/2022, Until Fri 9/2/2022, Print      ondansetron (Zofran ODT) 4 mg disintegrating tablet Take 1 tablet (4 mg total) by mouth every 6 (six) hours as needed for nausea or vomiting, Starting Sun 9/4/2022, Normal             No discharge procedures on file      PDMP Review     None          ED Provider  Electronically Signed by           Brad Armstrong PA-C  10/12/22 0021

## 2022-10-10 NOTE — ED NOTES
This writer discussed the patients current presentation and recommended discharge plan with Karoline CHAPARRO  They agree with the patient being discharged at this time with referrals and/or information about outpatient [place] [address] resources  The patient was Instructed to follow up with their PCP  The patient was provided with referral information for [address] [employer], as well as the local emergency [facility]  Patient would benefit from a Targeted  through [address] [address]  This writer and the patient completed a safety plan  The patient was provided with a copy of their safety plan with encouragement to utilize the plan following discharge  In addition, the patient was instructed to call [facility], other crisis services, [facility] or to go to the nearest ER immediately if their situation changes at any time  This writer discussed discharge plans with the patient who agrees with and understands the discharge plans  SAFETY PLAN  Warning Signs (thoughts, images, mood, behavior, situations) of a potential crisis: Increased anxiety  Coping Skills (what can I do to take my mind off the problem, or to keep myself safe): Contacting [employer] or Warm Line  Outside Support (who can I reach out to for support and help):  Out[facility], [employer], as well as the local emergency shelter list      National Suicide Prevention Hotline:  [address] 2-363.889.8781 - [place] Crisis/Mobile Crisis   [address] [facility]: [facility]: [address] [phone] - Crisis   [phone] - Peer Support Talk Line (1-9pm daily)  [phone] - [address] (1-9pm daily)  [id number] [address] [place] [phone] - [address]

## 2022-10-17 NOTE — ED ADULT TRIAGE NOTE - DOMESTIC TRAVEL HIGH RISK QUESTION
No
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
## 2022-11-08 ENCOUNTER — HOSPITAL ENCOUNTER (EMERGENCY)
Facility: HOSPITAL | Age: 32
Discharge: HOME | End: 2022-11-08
Attending: EMERGENCY MEDICINE

## 2022-11-08 ENCOUNTER — APPOINTMENT (EMERGENCY)
Dept: RADIOLOGY | Facility: HOSPITAL | Age: 32
End: 2022-11-08

## 2022-11-08 VITALS
HEART RATE: 77 BPM | OXYGEN SATURATION: 99 % | RESPIRATION RATE: 18 BRPM | TEMPERATURE: 98.6 F | SYSTOLIC BLOOD PRESSURE: 125 MMHG | DIASTOLIC BLOOD PRESSURE: 74 MMHG

## 2022-11-08 DIAGNOSIS — R11.2 NAUSEA AND VOMITING: ICD-10-CM

## 2022-11-08 DIAGNOSIS — R07.9 CHEST PAIN: Primary | ICD-10-CM

## 2022-11-08 LAB
ALBUMIN SERPL BCP-MCNC: 4.2 G/DL (ref 3.5–5)
ALP SERPL-CCNC: 59 U/L (ref 46–116)
ALT SERPL W P-5'-P-CCNC: 29 U/L (ref 12–78)
ANION GAP SERPL CALCULATED.3IONS-SCNC: 12 MMOL/L (ref 4–13)
AST SERPL W P-5'-P-CCNC: 28 U/L (ref 5–45)
BACTERIA UR QL AUTO: ABNORMAL /HPF
BASOPHILS # BLD AUTO: 0.06 THOUSANDS/ÂΜL (ref 0–0.1)
BASOPHILS NFR BLD AUTO: 1 % (ref 0–1)
BILIRUB SERPL-MCNC: 0.4 MG/DL (ref 0.2–1)
BILIRUB UR QL STRIP: NEGATIVE
BUN SERPL-MCNC: 13 MG/DL (ref 5–25)
CALCIUM SERPL-MCNC: 9 MG/DL (ref 8.3–10.1)
CARDIAC TROPONIN I PNL SERPL HS: 3 NG/L
CHLORIDE SERPL-SCNC: 103 MMOL/L (ref 96–108)
CLARITY UR: CLEAR
CO2 SERPL-SCNC: 24 MMOL/L (ref 21–32)
COLOR UR: COLORLESS
CREAT SERPL-MCNC: 0.83 MG/DL (ref 0.6–1.3)
EOSINOPHIL # BLD AUTO: 0.08 THOUSAND/ÂΜL (ref 0–0.61)
EOSINOPHIL NFR BLD AUTO: 1 % (ref 0–6)
ERYTHROCYTE [DISTWIDTH] IN BLOOD BY AUTOMATED COUNT: 19.7 % (ref 11.6–15.1)
EXT PREG TEST URINE: NEGATIVE
EXT. CONTROL ED NAV: NORMAL
GFR SERPL CREATININE-BSD FRML MDRD: 94 ML/MIN/1.73SQ M
GLUCOSE SERPL-MCNC: 95 MG/DL (ref 65–140)
GLUCOSE UR STRIP-MCNC: NEGATIVE MG/DL
HCT VFR BLD AUTO: 35 % (ref 34.8–46.1)
HGB BLD-MCNC: 10.2 G/DL (ref 11.5–15.4)
HGB UR QL STRIP.AUTO: NEGATIVE
IMM GRANULOCYTES # BLD AUTO: 0.02 THOUSAND/UL (ref 0–0.2)
IMM GRANULOCYTES NFR BLD AUTO: 0 % (ref 0–2)
KETONES UR STRIP-MCNC: NEGATIVE MG/DL
LEUKOCYTE ESTERASE UR QL STRIP: ABNORMAL
LIPASE SERPL-CCNC: 182 U/L (ref 73–393)
LYMPHOCYTES # BLD AUTO: 2.22 THOUSANDS/ÂΜL (ref 0.6–4.47)
LYMPHOCYTES NFR BLD AUTO: 29 % (ref 14–44)
MCH RBC QN AUTO: 20 PG (ref 26.8–34.3)
MCHC RBC AUTO-ENTMCNC: 29.1 G/DL (ref 31.4–37.4)
MCV RBC AUTO: 69 FL (ref 82–98)
MONOCYTES # BLD AUTO: 0.52 THOUSAND/ÂΜL (ref 0.17–1.22)
MONOCYTES NFR BLD AUTO: 7 % (ref 4–12)
NEUTROPHILS # BLD AUTO: 4.88 THOUSANDS/ÂΜL (ref 1.85–7.62)
NEUTS SEG NFR BLD AUTO: 62 % (ref 43–75)
NITRITE UR QL STRIP: NEGATIVE
NON-SQ EPI CELLS URNS QL MICRO: ABNORMAL /HPF
NRBC BLD AUTO-RTO: 0 /100 WBCS
PH UR STRIP.AUTO: 5.5 [PH]
PLATELET # BLD AUTO: 333 THOUSANDS/UL (ref 149–390)
PMV BLD AUTO: 8.8 FL (ref 8.9–12.7)
POTASSIUM SERPL-SCNC: 3.3 MMOL/L (ref 3.5–5.3)
PROT SERPL-MCNC: 8.4 G/DL (ref 6.4–8.4)
PROT UR STRIP-MCNC: NEGATIVE MG/DL
RBC # BLD AUTO: 5.1 MILLION/UL (ref 3.81–5.12)
RBC #/AREA URNS AUTO: ABNORMAL /HPF
SODIUM SERPL-SCNC: 139 MMOL/L (ref 135–147)
SP GR UR STRIP.AUTO: 1.01 (ref 1–1.03)
UROBILINOGEN UR STRIP-ACNC: <2 MG/DL
WBC # BLD AUTO: 7.78 THOUSAND/UL (ref 4.31–10.16)
WBC #/AREA URNS AUTO: ABNORMAL /HPF

## 2022-11-08 RX ORDER — ONDANSETRON 4 MG/1
4 TABLET, ORALLY DISINTEGRATING ORAL EVERY 6 HOURS PRN
Qty: 20 TABLET | Refills: 0 | Status: SHIPPED | OUTPATIENT
Start: 2022-11-08

## 2022-11-08 RX ORDER — FAMOTIDINE 20 MG/1
20 TABLET, FILM COATED ORAL 2 TIMES DAILY
Qty: 30 TABLET | Refills: 0 | Status: SHIPPED | OUTPATIENT
Start: 2022-11-08

## 2022-11-08 RX ORDER — ONDANSETRON 2 MG/ML
4 INJECTION INTRAMUSCULAR; INTRAVENOUS ONCE
Status: COMPLETED | OUTPATIENT
Start: 2022-11-08 | End: 2022-11-08

## 2022-11-08 RX ADMIN — SODIUM CHLORIDE 1000 ML: 0.9 INJECTION, SOLUTION INTRAVENOUS at 18:38

## 2022-11-08 RX ADMIN — ONDANSETRON 4 MG: 2 INJECTION INTRAMUSCULAR; INTRAVENOUS at 18:37

## 2022-11-09 ENCOUNTER — HOSPITAL ENCOUNTER (EMERGENCY)
Facility: HOSPITAL | Age: 32
Discharge: HOME | End: 2022-11-10
Attending: EMERGENCY MEDICINE

## 2022-11-09 DIAGNOSIS — Z59.00 HOMELESS: Primary | ICD-10-CM

## 2022-11-09 LAB
ATRIAL RATE: 71 BPM
P AXIS: 85 DEGREES
PR INTERVAL: 124 MS
QRS AXIS: 87 DEGREES
QRSD INTERVAL: 86 MS
QT INTERVAL: 410 MS
QTC INTERVAL: 445 MS
T WAVE AXIS: 62 DEGREES
VENTRICULAR RATE: 71 BPM

## 2022-11-09 SDOH — ECONOMIC STABILITY - HOUSING INSECURITY: HOMELESSNESS UNSPECIFIED: Z59.00

## 2022-11-10 VITALS
WEIGHT: 157.63 LBS | HEIGHT: 62 IN | SYSTOLIC BLOOD PRESSURE: 98 MMHG | BODY MASS INDEX: 29.01 KG/M2 | OXYGEN SATURATION: 96 % | TEMPERATURE: 97.6 F | HEART RATE: 72 BPM | DIASTOLIC BLOOD PRESSURE: 62 MMHG | RESPIRATION RATE: 16 BRPM

## 2022-11-10 LAB
AMPHETAMINES SERPL QL SCN: NEGATIVE
BACTERIA UR QL AUTO: ABNORMAL /HPF
BARBITURATES UR QL: NEGATIVE
BENZODIAZ UR QL: NEGATIVE
BILIRUB UR QL STRIP: NEGATIVE
CLARITY UR: ABNORMAL
COCAINE UR QL: NEGATIVE
COLOR UR: ABNORMAL
ETHANOL EXG-MCNC: 0 MG/DL
EXT PREG TEST URINE: NEGATIVE
EXT. CONTROL ED NAV: NORMAL
GLUCOSE UR STRIP-MCNC: NEGATIVE MG/DL
HGB UR QL STRIP.AUTO: NEGATIVE
KETONES UR STRIP-MCNC: NEGATIVE MG/DL
LEUKOCYTE ESTERASE UR QL STRIP: ABNORMAL
METHADONE UR QL: NEGATIVE
MUCOUS THREADS UR QL AUTO: ABNORMAL
NITRITE UR QL STRIP: NEGATIVE
NON-SQ EPI CELLS URNS QL MICRO: ABNORMAL /HPF
OPIATES UR QL SCN: NEGATIVE
OXYCODONE+OXYMORPHONE UR QL SCN: NEGATIVE
PCP UR QL: NEGATIVE
PH UR STRIP.AUTO: 5 [PH]
PROT UR STRIP-MCNC: NEGATIVE MG/DL
RBC #/AREA URNS AUTO: ABNORMAL /HPF
SP GR UR STRIP.AUTO: 1.01 (ref 1–1.03)
THC UR QL: NEGATIVE
UROBILINOGEN UR STRIP-ACNC: <2 MG/DL
WBC #/AREA URNS AUTO: ABNORMAL /HPF

## 2022-11-10 RX ORDER — BACITRACIN, NEOMYCIN, POLYMYXIN B 400; 3.5; 5 [USP'U]/G; MG/G; [USP'U]/G
1 OINTMENT TOPICAL ONCE
Status: COMPLETED | OUTPATIENT
Start: 2022-11-10 | End: 2022-11-10

## 2022-11-10 RX ORDER — EMTRICITABINE AND TENOFOVIR DISOPROXIL FUMARATE 200; 300 MG/1; MG/1
1 TABLET, FILM COATED ORAL
COMMUNITY
Start: 2022-05-17

## 2022-11-10 RX ORDER — LORATADINE 10 MG/1
10 TABLET ORAL DAILY
COMMUNITY
Start: 2022-09-20

## 2022-11-10 RX ORDER — QUETIAPINE FUMARATE 50 MG/1
50 TABLET, FILM COATED ORAL DAILY
COMMUNITY
Start: 2022-09-20

## 2022-11-10 RX ADMIN — NEOMYCIN AND POLYMYXIN B SULFATES AND BACITRACIN ZINC 1 SMALL APPLICATION: 400; 3.5; 5 OINTMENT TOPICAL at 10:43

## 2022-11-10 NOTE — ED PROVIDER NOTES
History  Chief Complaint   Patient presents with   • Psychiatric Evaluation     Reports generalized illness, 'I dont feel well because I am homeless and dont have support, can I talk to mental health crisis' reports off medications x2 weeks, denies SI/HI, reports AH 'background noises'      27 y/o female presents to the ED for psychiatric evaluation  Patient states that she is homeless and does not have anywhere to go  States that she was seen here yesterday because she "wasn't feeling well" after being on the streets the last few days  She had blood work done, which was unremarkable  She states that she was suppose to get a bed at [address] [place] [address] yesterday but her phone  and she was unable to get in touch with them  She states that she would like to see crisis and would like placement there  She denies any SI/HI or drug/alcohol use  No other complaints  History provided by:  Patient  Psychiatric Evaluation  Presenting symptoms: no agitation, no depression, no homicidal ideas, no suicidal thoughts and no suicidal threats    Degree of incapacity (severity): Moderate  Onset quality:  Sudden  Chronicity:  New  Context: stressful life event    Treatment compliance:  Untreated  Relieved by:  None tried  Worsened by:  Nothing  Ineffective treatments:  None tried  Associated symptoms: no abdominal pain, no chest pain, no headaches, no irritability and no poor judgment    Risk factors: hx of mental illness        Prior to Admission Medications   Prescriptions Last Dose Informant Patient Reported?  Taking?   dicyclomine (BENTYL) 20 mg tablet   No No   Sig: Take 1 tablet (20 mg total) by mouth 2 (two) times a day   famotidine (PEPCID) 20 mg tablet   No No   Sig: Take 1 tablet (20 mg total) by mouth 2 (two) times a day   ketotifen (ZADITOR) 0 025 % ophthalmic solution   No No   Sig: Administer 1 drop to both eyes 2 (two) times a day for 7 days   ondansetron (Zofran ODT) 4 mg disintegrating tablet   No No   Sig: Take 1 tablet (4 mg total) by mouth every 6 (six) hours as needed for nausea or vomiting   ondansetron (Zofran ODT) 4 mg disintegrating tablet   No No   Sig: Take 1 tablet (4 mg total) by mouth every 6 (six) hours as needed for nausea or vomiting      Facility-Administered Medications: None       Past Medical History:   Diagnosis Date   • Addiction to drug [facility])    • Anxiety    • Depression        History reviewed  No pertinent surgical history  History reviewed  No pertinent family history  I have reviewed and agree with the history as documented  E-Cigarette/Vaping   • E-Cigarette Use Never User      E-Cigarette/Vaping Substances   • Nicotine No    • THC No    • CBD No    • Flavoring No    • Other No    • Unknown No      Social History     Tobacco Use   • Smoking status: Current Some Day Smoker   • Smokeless tobacco: Never Used   • Tobacco comment: Pt reports having smoked a pack of cigarettes yesterday   Vaping Use   • Vaping Use: Never used   Substance Use Topics   • Alcohol use: Never     Comment: socially   • Drug use: Yes     Types: Marijuana       Review of Systems   Constitutional: Negative for chills, fever and irritability  HENT: Negative for congestion, ear pain and sore throat  Eyes: Negative for pain and visual disturbance  Respiratory: Negative for cough, shortness of breath and wheezing  Cardiovascular: Negative for chest pain and leg swelling  Gastrointestinal: Negative for abdominal pain, diarrhea, nausea and vomiting  Genitourinary: Negative for dysuria, frequency, hematuria and urgency  Musculoskeletal: Negative for neck pain and neck stiffness  Skin: Negative for rash and wound  Neurological: Negative for weakness, numbness and headaches  Psychiatric/Behavioral: Negative for agitation, confusion, homicidal ideas and suicidal ideas  All other systems reviewed and are negative  Physical Exam  Physical Exam  Vitals and nursing note reviewed     Constitutional: Appearance: She is well-developed  HENT:      Head: Normocephalic and atraumatic  Eyes:      Pupils: Pupils are equal, round, and reactive to light  Cardiovascular:      Rate and Rhythm: Normal rate and regular rhythm  Pulmonary:      Effort: Pulmonary effort is normal       Breath sounds: Normal breath sounds  Abdominal:      General: Bowel sounds are normal       Palpations: Abdomen is soft  Musculoskeletal:         General: Normal range of motion  Cervical back: Normal range of motion and neck supple  Skin:     General: Skin is warm and dry  Neurological:      General: No focal deficit present  Mental Status: She is alert and oriented to person, place, and time  Comments: No focal deficits         Vital Signs  ED Triage Vitals [11/09/22 1453]   Temperature Pulse Respirations Blood Pressure SpO2   97 9 °F (36 6 °C) 73 18 116/59 100 %      Temp Source Heart Rate Source Patient Position - Orthostatic VS BP Location FiO2 (%)   Oral -- -- -- --      Pain Score       --           Vitals:    11/09/22 1453 11/09/22 1858 11/09/22 1900 11/09/22 2100   BP: 116/59 104/61 130/68    Pulse: 73 66 72 74         Visual Acuity      ED Medications  Medications - No data to display    Diagnostic Studies  Results Reviewed     None                 No orders to display              Procedures  Procedures         ED Course                               SBIRT 22yo+    Flowsheet Row Most Recent Value   SBIRT (25 yo +)    In order to provide better care to our patients, we are screening all of our patients for alcohol and drug use  Would it be okay to ask you these screening questions? Yes Filed at: 11/09/2022 1831   Initial Alcohol Screen: US AUDIT-C     1  How often do you have a drink containing alcohol? 0 Filed at: 11/09/2022 1831   2  How many drinks containing alcohol do you have on a typical day you are drinking? 0 Filed at: 11/09/2022 1831   3a  Male UNDER 65:  How often do you have five or more drinks on one occasion? 0 Filed at: 11/09/2022 1831   3b  FEMALE Any Age, or MALE 65+: How often do you have 4 or more drinks on one occassion? 0 Filed at: 11/09/2022 1831   Audit-C Score 0 Filed at: 11/09/2022 1831   ARASH: How many times in the past year have you    Used an illegal drug or used a prescription medication for non-medical reasons? Never Filed at: 11/09/2022 1831                    MDM  Number of Diagnoses or Management Options  Homeless: new and requires workup  Diagnosis management comments: Patient here for being homeless- will have crisis see patient for resources  Amount and/or Complexity of Data Reviewed  Clinical lab tests: ordered and reviewed  Tests in the radiology section of CPT®: ordered and reviewed  Tests in the medicine section of CPT®: ordered and reviewed  Discussion of test results with the performing providers: yes  Decide to obtain previous medical records or to obtain history from someone other than the patient: yes  Obtain history from someone other than the patient: yes  Review and summarize past medical records: yes  Discuss the patient with other providers: yes  Independent visualization of images, tracings, or specimens: yes    Patient Progress  Patient progress: improved      Disposition  Final diagnoses:   Homeless     Time reflects when diagnosis was documented in both MDM as applicable and the Disposition within this note     Time User Action Codes Description Comment    11/9/2022  9:08 PM Papito Dietz Add [Z59 00] Homeless       ED Disposition     ED Disposition   Discharge    Condition   Stable    Date/Time   Wed Nov 9, 2022  9:08 PM    Comment   Yesy Maloney discharge to home/self care                 Follow-up Information     Follow up With Specialties Details Why Contact Info Additional [address], DO Family Medicine Call in 1 day for follow up within 1 week [address]  [address]  [address] [phone]         [facility] Emergency Department Emergency Medicine Go to  immediately for any new or worsening symptoms [address] [facility] [address] [facility] Emergency Department, [address] [facility], [place], [place], [address]          Patient's Medications   Discharge Prescriptions    No medications on file       No discharge procedures on file      PDMP Review     None          ED Provider  Electronically Signed by eval            Amount and/or Complexity of Data Reviewed  Clinical lab tests: ordered and reviewed  Tests in the radiology section of CPT®: ordered and reviewed  Tests in the medicine section of CPT®: ordered and reviewed  Discussion of test results with the performing providers: yes  Decide to obtain previous medical records or to obtain history from someone other than the patient: yes  Obtain history from someone other than the patient: yes  Review and summarize past medical records: yes  Discuss the patient with other providers: yes  Independent visualization of images, tracings, or specimens: yes    Patient Progress  Patient progress: improved      Disposition  Final diagnoses:   Homeless     Time reflects when diagnosis was documented in both MDM as applicable and the Disposition within this note     Time User Action Codes Description Comment    11/9/2022  9:08 PM Sulma Beaulieu Add [Z59 00] Homeless       ED Disposition     ED Disposition   Discharge    Condition   Stable    Date/Time   Wed Nov 9, 2022  9:08 PM    Comment   Alfonso Mclain discharge to home/self care                 MD Documentation    Simon García Most Recent Value   Sending MD Dr Smith Abreu up With Specialties Details Why Contact Info Additional Information    Keely Simental DO Family Medicine Call in 1 day for follow up within 1 week [address] [facility] [place] [phone]       [address] [facility] Emergency Department Emergency Medicine Go to  immediately for any new or worsening symptoms Elza Juan [address] Emergency Department, [address] [place] [address], [id number]          Discharge Medication List as of 11/9/2022  9:09 PM      CONTINUE these medications which have NOT CHANGED    Details   dicyclomine (BENTYL) 20 mg tablet Take 1 tablet (20 mg total) by mouth 2 (two) times a day, Starting Sun 9/4/2022, Normal      famotidine (PEPCID) 20 mg tablet Take 1 tablet (20 mg total) by mouth 2 (two) times a day, Starting Tue 11/8/2022, Normal      ketotifen (ZADITOR) 0 025 % ophthalmic solution Administer 1 drop to both eyes 2 (two) times a day for 7 days, Starting Fri 8/26/2022, Until Fri 9/2/2022, Print      !! ondansetron (Zofran ODT) 4 mg disintegrating tablet Take 1 tablet (4 mg total) by mouth every 6 (six) hours as needed for nausea or vomiting, Starting Sun 9/4/2022, Normal      !! ondansetron (Zofran ODT) 4 mg disintegrating tablet Take 1 tablet (4 mg total) by mouth every 6 (six) hours as needed for nausea or vomiting, Starting Tue 11/8/2022, Normal       !! - Potential duplicate medications found  Please discuss with provider  No discharge procedures on file      PDMP Review     None          ED Provider  Electronically Signed by           Liliane Peters DO  11/15/22 2879

## 2022-11-10 NOTE — ED NOTES
Pt is a 32 y o  female who presented to the ED due to “feeling cold and like she was going to die"  Upon assessment, the patient is sitting up in her bed and compliant with questions  Patient reports that she has been homeless for some time and had been staying at the [facility], although no longer felt comfortable and decided to leave  Patient admits to previous mental health treatment, and is currently linked with [facility] for outpatient psychiatric care and also speaks with a therapist twice a week via phone  Patient admits that she was just released from [facility] after having a bench warrant served due to a DUI she received 3 months ago  Patient has an upcoming court date on 11/16  Patient adamantly denies any current substance use of any kind  Patient also denies suicidal ideation, admitting that approximately 1 month ago she had had increased anger and had thought to "hit walls"  Patient denies any previous suicide attempts as well as any homicidal thoughts at this time or violence in the past   Patient also currently denies auditory hallucinations or visual hallucinations, and there are no signs of psychosis present at this time  Patient does report that she at times has auditory hallucinations which she explains as “background noise"  Patient reports appropriate appetite yet poor sleep  Discussed treatment options with the patient who indicated that she wants to call [facility] on her own  Patient does not appear to require an inpatient level of care, and would be encouraged to go to a shelter from here if [facility] is unable to accommodate her      Chief Complaint   Patient presents with   • Psychiatric Evaluation     Reports generalized illness, 'I dont feel well because I am homeless and dont have support, can I talk to mental health crisis' reports off medications x2 weeks, denies SI/HI, reports AH 'background noises'      Intake Assessment completed, Safety Risk Assessment completed      Akshat Dubon LMSW  11/10/22  3501

## 2022-11-10 NOTE — ED NOTES
Call received from [facility] at [facility] requesting a referral to be sent for placement at the [facility]  Referral sent      Diana May LMSW  11/10/22  1592

## 2022-11-10 NOTE — ED NOTES
Patient given discharge papers and explained that referral for [facility] was put in on patient's behalf  RN confirmed with patient that [facility] had reached out and contacted patient and patient stated yes they reached out and would give a call back  Per provider and crisis, no further resources needed from ED at this time and patient is able to be discharged  Patient was given resources for nearby available shelters due to patient currently being homeless  Patient states understanding        Dalton Mcduffie RN  11/10/22 1876

## 2022-11-11 ENCOUNTER — HOSPITAL ENCOUNTER (EMERGENCY)
Facility: HOSPITAL | Age: 32
Discharge: HOME | End: 2022-11-11
Attending: EMERGENCY MEDICINE

## 2022-11-11 VITALS
SYSTOLIC BLOOD PRESSURE: 118 MMHG | OXYGEN SATURATION: 100 % | RESPIRATION RATE: 18 BRPM | DIASTOLIC BLOOD PRESSURE: 58 MMHG | TEMPERATURE: 97.8 F | HEART RATE: 65 BPM

## 2022-11-11 DIAGNOSIS — U07.1 COVID: Primary | ICD-10-CM

## 2022-11-11 LAB
BACTERIA UR CULT: NORMAL
FLUAV RNA RESP QL NAA+PROBE: NEGATIVE
FLUBV RNA RESP QL NAA+PROBE: NEGATIVE
RSV RNA RESP QL NAA+PROBE: NEGATIVE
S PYO DNA THROAT QL NAA+PROBE: NOT DETECTED
SARS-COV-2 RNA RESP QL NAA+PROBE: POSITIVE

## 2022-11-11 RX ORDER — ACETAMINOPHEN 325 MG/1
650 TABLET ORAL ONCE
Status: COMPLETED | OUTPATIENT
Start: 2022-11-11 | End: 2022-11-11

## 2022-11-11 RX ORDER — NIRMATRELVIR AND RITONAVIR 300-100 MG
3 KIT ORAL 2 TIMES DAILY
Qty: 30 TABLET | Refills: 0 | Status: SHIPPED | OUTPATIENT
Start: 2022-11-11 | End: 2022-11-16

## 2022-11-11 RX ORDER — DEXAMETHASONE 6 MG/1
6 TABLET ORAL 2 TIMES DAILY WITH MEALS
Qty: 6 TABLET | Refills: 0 | Status: SHIPPED | OUTPATIENT
Start: 2022-11-11 | End: 2022-11-14

## 2022-11-11 RX ADMIN — PREDNISONE 50 MG: 20 TABLET ORAL at 04:55

## 2022-11-11 RX ADMIN — ACETAMINOPHEN 650 MG: 325 TABLET ORAL at 04:55

## 2022-11-11 NOTE — ED PROVIDER NOTES
History  Chief Complaint   Patient presents with   • Flu Symptoms     Pt arrived ambulatory with c/o throat pain x 1 week  This is a 49-year-old female with no significant past medical history the presents emergency department with sore throat, chest tightness  Symptoms been going on for about a week  No fever no chills no rashes  Past medical history negative  Patient does smoke and drink  Surgical history negative      History provided by:  Patient   used: No    Flu Symptoms  Presenting symptoms: cough, shortness of breath and sore throat    Presenting symptoms: no fever and no vomiting    Associated symptoms: no chills and no ear pain        Prior to Admission Medications   Prescriptions Last Dose Informant Patient Reported? Taking?    QUEtiapine (SEROquel) 50 mg tablet   Yes No   Sig: Take 50 mg by mouth daily   dicyclomine (BENTYL) 20 mg tablet   No No   Sig: Take 1 tablet (20 mg total) by mouth 2 (two) times a day   Patient not taking: Reported on 11/10/2022   dolutegravir (TIVICAY) 50 MG TABS   Yes No   Sig: Take 50 mg by mouth in the morning   emtricitabine-tenofovir (TRUVADA) 200-300 mg per tablet   Yes No   Sig: Take 1 tablet by mouth   famotidine (PEPCID) 20 mg tablet   No No   Sig: Take 1 tablet (20 mg total) by mouth 2 (two) times a day   Patient not taking: Reported on 11/10/2022   ketotifen (ZADITOR) 0 025 % ophthalmic solution   No No   Sig: Administer 1 drop to both eyes 2 (two) times a day for 7 days   loratadine (CLARITIN) 10 mg tablet   Yes No   Sig: Take 10 mg by mouth daily   ondansetron (Zofran ODT) 4 mg disintegrating tablet   No No   Sig: Take 1 tablet (4 mg total) by mouth every 6 (six) hours as needed for nausea or vomiting   Patient not taking: Reported on 11/10/2022   ondansetron (Zofran ODT) 4 mg disintegrating tablet   No No   Sig: Take 1 tablet (4 mg total) by mouth every 6 (six) hours as needed for nausea or vomiting   Patient not taking: Reported on 11/10/2022      Facility-Administered Medications: None       Past Medical History:   Diagnosis Date   • Addiction to drug [facility])    • Anxiety    • Depression        History reviewed  No pertinent surgical history  History reviewed  No pertinent family history  I have reviewed and agree with the history as documented  E-Cigarette/Vaping   • E-Cigarette Use Never User      E-Cigarette/Vaping Substances   • Nicotine No    • THC No    • CBD No    • Flavoring No    • Other No    • Unknown No      Social History     Tobacco Use   • Smoking status: Current Some Day Smoker   • Smokeless tobacco: Never Used   • Tobacco comment: Pt reports having smoked a pack of cigarettes yesterday   Vaping Use   • Vaping Use: Never used   Substance Use Topics   • Alcohol use: Never     Comment: socially   • Drug use: Yes     Types: Marijuana       Review of Systems   Constitutional: Negative for chills and fever  HENT: Positive for sore throat  Negative for ear pain  Eyes: Negative for pain and visual disturbance  Respiratory: Positive for cough, chest tightness and shortness of breath  Cardiovascular: Negative for chest pain and palpitations  Gastrointestinal: Negative for abdominal pain and vomiting  Genitourinary: Negative for dysuria and hematuria  Musculoskeletal: Negative for arthralgias and back pain  Skin: Negative for color change and rash  Neurological: Negative for seizures and syncope  All other systems reviewed and are negative  Physical Exam  Physical Exam  Vitals and nursing note reviewed  Constitutional:       General: She is not in acute distress  Appearance: Normal appearance  She is well-developed  HENT:      Head: Normocephalic and atraumatic  Right Ear: External ear normal       Left Ear: External ear normal       Nose: Nose normal       Mouth/Throat:      Mouth: Mucous membranes are moist       Pharynx: Posterior oropharyngeal erythema present  No oropharyngeal exudate  Eyes:      Extraocular Movements: Extraocular movements intact  Conjunctiva/sclera: Conjunctivae normal       Pupils: Pupils are equal, round, and reactive to light  Neck:      Vascular: No carotid bruit  Cardiovascular:      Rate and Rhythm: Normal rate and regular rhythm  Pulses: Normal pulses  Heart sounds: Normal heart sounds  No murmur heard  Pulmonary:      Effort: Pulmonary effort is normal  No respiratory distress  Breath sounds: Normal breath sounds  Abdominal:      General: Abdomen is flat  Palpations: Abdomen is soft  Tenderness: There is no abdominal tenderness  Musculoskeletal:      Cervical back: Neck supple  Tenderness present  Lymphadenopathy:      Cervical: Cervical adenopathy present  Skin:     General: Skin is warm and dry  Capillary Refill: Capillary refill takes less than 2 seconds  Neurological:      General: No focal deficit present  Mental Status: She is alert and oriented to person, place, and time     Psychiatric:         Mood and Affect: Mood normal          Behavior: Behavior normal          Vital Signs  ED Triage Vitals   Temperature Pulse Respirations Blood Pressure SpO2   11/11/22 0452 11/11/22 0451 11/11/22 0451 11/11/22 0451 11/11/22 0451   97 8 °F (36 6 °C) 65 18 118/58 100 %      Temp Source Heart Rate Source Patient Position - Orthostatic VS BP Location FiO2 (%)   11/11/22 0452 11/11/22 0451 11/11/22 0451 11/11/22 0451 --   Oral Monitor Sitting Right arm       Pain Score       11/11/22 0455       10 - Worst Possible Pain           Vitals:    11/11/22 0451   BP: 118/58   Pulse: 65   Patient Position - Orthostatic VS: Sitting         Visual Acuity      ED Medications  Medications   acetaminophen (TYLENOL) tablet 650 mg (650 mg Oral Given 11/11/22 0455)   predniSONE tablet 50 mg (50 mg Oral Given 11/11/22 0455)       Diagnostic Studies  Results Reviewed     Procedure Component Value Units Date/Time    FLU/RSV/COVID - if FLU/RSV clinically relevant [[id number]]  (Abnormal) Collected: 11/11/22 0456    Lab Status: Final result Specimen: Nares from Nose Updated: 11/11/22 0541     SARS-CoV-2 Positive     INFLUENZA A PCR Negative     INFLUENZA B PCR Negative     RSV PCR Negative    Narrative:      FOR PEDIATRIC PATIENTS - copy/paste COVID Guidelines URL to browser: [email or web address]    SARS-CoV-2 assay is a Nucleic Acid Amplification assay intended for the  qualitative detection of nucleic acid from SARS-CoV-2 in nasopharyngeal  swabs  Results are for the presumptive identification of SARS-CoV-2 RNA  Positive results are indicative of infection with SARS-CoV-2, the virus  causing COVID-19, but do not rule out bacterial infection or co-infection  with other viruses  Laboratories within the [place] and its  territories are required to report all positive results to the appropriate  public health authorities  Negative results do not preclude SARS-CoV-2  infection and should not be used as the sole basis for treatment or other  patient management decisions  Negative results must be combined with  clinical observations, patient history, and epidemiological information  This test has not been FDA cleared or approved  This test has been authorized by FDA under an Emergency Use Authorization  (EUA)  This test is only authorized for the duration of time the  declaration that circumstances exist justifying the authorization of the  emergency use of an in vitro diagnostic tests for detection of SARS-CoV-2  virus and/or diagnosis of COVID-19 infection under section 564(b)(1) of  the Act, 21 U  S C  849IXJ-6(F)(2), unless the authorization is terminated  or revoked sooner  The test has been validated but independent review by FDA  and CLIA is pending  Test performed using [employer] Gene[employer]XI[employer]: This RT-PCR assay targets N2,  a region unique to SARS-CoV-2   A conserved region in the E-gene was chosen  for pan-Sarbecovirus detection which includes SARS-CoV-2  According to CMS-2020-01-R, this platform meets the definition of high-throughput technology  Strep A PCR [[id number]]  (Normal) Collected: 11/11/22 0456    Lab Status: Final result Specimen: Throat Updated: 11/11/22 0530     STREP A PCR Not Detected                 No orders to display              Procedures  Procedures         ED Course  ED Course as of 11/11/22 0551   [facility] Nov 11, 2022   0548 SARS-COV-2(!): Positive                                             MDM  Number of Diagnoses or Management Options  Risk of Complications, Morbidity, and/or Mortality  Presenting problems: low  Diagnostic procedures: low  Management options: low  General comments: + COVID    Patient Progress  Patient progress: stable      Disposition  Final diagnoses:   COVID     Time reflects when diagnosis was documented in both MDM as applicable and the Disposition within this note     Time User Action Codes Description Comment    11/11/2022  5:50 AM Edgar Johnson [U07 1] [facility]       ED Disposition     ED Disposition   Discharge    Condition   Stable    Date/Time   Fri Nov 11, 2022  5:50 AM    Comment   Paramjit Alicia discharge to home/self care  Follow-up Information    None         Patient's Medications   Discharge Prescriptions    DEXAMETHASONE (DECADRON) 6 MG TABLET    Take 1 tablet (6 mg total) by mouth 2 (two) times a day with meals for 3 days       Start Date: 11/11/2022End Date: 11/14/2022       Order Dose: 6 mg       Quantity: 6 tablet    Refills: 0    NIRMATRELVIR & RITONAVIR (PAXLOVID, 300/100,) TABLET THERAPY PACK    Take 3 tablets by mouth 2 (two) times a day for 5 days Take 2 nirmatrelvir tablets + 1 ritonavir tablet together per dose       Start Date: 11/11/2022End Date: 11/16/2022       Order Dose: 3 tablets       Quantity: 30 tablet    Refills: 0       No discharge procedures on file      PDMP Review     None          ED Provider  Electronically Signed by           Jenna Bowling MD  11/11/22 9734

## 2022-11-14 NOTE — ED PROVIDER NOTES
History  Chief Complaint   Patient presents with   • Chest Pain     Pt c/o tightness in chest and burning sensation in legs for few days  Pt currently states is homeless and wishes to speak to crisis     35-year-old female presenting emergency department for evaluation of chest pain  Patient has substernal chest tightness, burning sensation, nausea vomiting, also has myalgias  On examination, she does not want to speak with crisis and has no HI or SI  Prior to Admission Medications   Prescriptions Last Dose Informant Patient Reported? Taking?   dicyclomine (BENTYL) 20 mg tablet   No No   Sig: Take 1 tablet (20 mg total) by mouth 2 (two) times a day   Patient not taking: Reported on 11/10/2022   ketotifen (ZADITOR) 0 025 % ophthalmic solution   No No   Sig: Administer 1 drop to both eyes 2 (two) times a day for 7 days   ondansetron (Zofran ODT) 4 mg disintegrating tablet   No No   Sig: Take 1 tablet (4 mg total) by mouth every 6 (six) hours as needed for nausea or vomiting   Patient not taking: Reported on 11/10/2022      Facility-Administered Medications: None       Past Medical History:   Diagnosis Date   • Addiction to drug [facility])    • Anxiety    • Depression        History reviewed  No pertinent surgical history  History reviewed  No pertinent family history  I have reviewed and agree with the history as documented      E-Cigarette/Vaping   • E-Cigarette Use Never User      E-Cigarette/Vaping Substances   • Nicotine No    • THC No    • CBD No    • Flavoring No    • Other No    • Unknown No      Social History     Tobacco Use   • Smoking status: Current Some Day Smoker   • Smokeless tobacco: Never Used   • Tobacco comment: Pt reports having smoked a pack of cigarettes yesterday   Vaping Use   • Vaping Use: Never used   Substance Use Topics   • Alcohol use: Never     Comment: socially   • Drug use: Yes     Types: Marijuana       Review of Systems   Constitutional: Negative for appetite change, chills, fatigue and fever  HENT: Negative for sneezing and sore throat  Eyes: Negative for visual disturbance  Respiratory: Negative for cough, choking, chest tightness, shortness of breath and wheezing  Cardiovascular: Positive for chest pain  Negative for palpitations  Gastrointestinal: Positive for nausea and vomiting  Negative for abdominal pain, constipation and diarrhea  Genitourinary: Negative for difficulty urinating and dysuria  Neurological: Negative for dizziness, weakness, light-headedness, numbness and headaches  All other systems reviewed and are negative  Physical Exam  Physical Exam  Vitals and nursing note reviewed  Constitutional:       General: She is not in acute distress  Appearance: She is well-developed  She is not diaphoretic  HENT:      Head: Normocephalic and atraumatic  Eyes:      Pupils: Pupils are equal, round, and reactive to light  Neck:      Vascular: No JVD  Trachea: No tracheal deviation  Cardiovascular:      Rate and Rhythm: Normal rate and regular rhythm  Heart sounds: Normal heart sounds  No murmur heard  No friction rub  No gallop  Pulmonary:      Effort: Pulmonary effort is normal  No respiratory distress  Breath sounds: Normal breath sounds  No wheezing or rales  Abdominal:      General: Bowel sounds are normal  There is no distension  Palpations: Abdomen is soft  Tenderness: There is no abdominal tenderness  There is no guarding or rebound  Skin:     General: Skin is warm and dry  Coloration: Skin is not pale  Neurological:      Mental Status: She is alert and oriented to person, place, and time  Cranial Nerves: No cranial nerve deficit  Motor: No abnormal muscle tone     Psychiatric:         Behavior: Behavior normal          Vital Signs  ED Triage Vitals [11/08/22 1823]   Temperature Pulse Respirations Blood Pressure SpO2   98 6 °F (37 °C) 68 18 156/83 99 %      Temp src Heart Rate Source Patient Position - Orthostatic VS BP Location FiO2 (%)   -- Monitor Sitting Right arm --      Pain Score       10 - Worst Possible Pain           Vitals:    11/08/22 1823 11/08/22 1945   BP: 156/83 125/74   Pulse: 68 77   Patient Position - Orthostatic VS: Sitting Lying         Visual Acuity      ED Medications  Medications   sodium chloride 0 9 % bolus 1,000 mL (0 mL Intravenous Stopped 11/8/22 2134)   ondansetron (ZOFRAN) injection 4 mg (4 mg Intravenous Given 11/8/22 1837)       Diagnostic Studies  Results Reviewed     Procedure Component Value Units Date/Time    Urine Microscopic [[id number]]  (Abnormal) Collected: 11/08/22 2006    Lab Status: Final result Specimen: Urine, Clean Catch Updated: 11/08/22 2016     RBC, UA 1-2 /hpf      WBC, UA 4-10 /hpf      Epithelial Cells Occasional /hpf      Bacteria, UA None Seen /hpf     UA w Reflex to Microscopic w Reflex to Culture [[id number]]  (Abnormal) Collected: 11/08/22 2006    Lab Status: Final result Specimen: Urine, Clean Catch Updated: 11/08/22 2015     Color, UA Colorless     Clarity, UA Clear     Specific Gravity, UA 1 009     pH, UA 5 5     Leukocytes, UA Small     Nitrite, UA Negative     Protein, UA Negative mg/dl      Glucose, UA Negative mg/dl      Ketones, UA Negative mg/dl      Urobilinogen, UA <2 0 mg/dl      Bilirubin, UA Negative     Occult Blood, UA Negative    POCT pregnancy, urine [[id number]]  (Normal) Resulted: 11/08/22 2008    Lab Status: Final result Updated: 11/08/22 2008     EXT PREG TEST UR (Ref: Negative) Negative     Control Valid    HS Troponin 0hr (reflex protocol) [[id number]]  (Normal) Collected: 11/08/22 1817    Lab Status: Final result Specimen: Blood from Arm, Right Updated: 11/08/22 1858     hs TnI 0hr 3 ng/L     Comprehensive metabolic panel [[id number]]  (Abnormal) Collected: 11/08/22 1817    Lab Status: Final result Specimen: Blood from Arm, Right Updated: 11/08/22 1844     Sodium 139 mmol/L      Potassium 3 3 mmol/L      Chloride 103 mmol/L CO2 24 mmol/L      ANION GAP 12 mmol/L      BUN 13 mg/dL      Creatinine 0 83 mg/dL      Glucose 95 mg/dL      Calcium 9 0 mg/dL      AST 28 U/L      ALT 29 U/L      Alkaline Phosphatase 59 U/L      Total Protein 8 4 g/dL      Albumin 4 2 g/dL      Total Bilirubin 0 40 mg/dL      eGFR 94 ml/min/1 73sq m     Narrative:      National Kidney Disease Foundation guidelines for Chronic Kidney Disease (CKD):   •  Stage 1 with normal or high GFR (GFR > 90 mL/min/1 73 square meters)  •  Stage 2 Mild CKD (GFR = 60-89 mL/min/1 73 square meters)  •  Stage 3A Moderate CKD (GFR = 45-59 mL/min/1 73 square meters)  •  Stage 3B Moderate CKD (GFR = 30-44 mL/min/1 73 square meters)  •  Stage 4 Severe CKD (GFR = 15-29 mL/min/1 73 square meters)  •  Stage 5 End Stage CKD (GFR <15 mL/min/1 73 square meters)  Note: GFR calculation is accurate only with a steady state creatinine    Lipase [[id number]]  (Normal) Collected: 11/08/22 1817    Lab Status: Final result Specimen: Blood from Arm, Right Updated: 11/08/22 1844     Lipase 182 u/L     CBC and differential [[id number]]  (Abnormal) Collected: 11/08/22 1817    Lab Status: Final result Specimen: Blood from Arm, Right Updated: 11/08/22 1822     WBC 7 78 Thousand/uL      RBC 5 10 Million/uL      Hemoglobin 10 2 g/dL      Hematocrit 35 0 %      MCV 69 fL      MCH 20 0 pg      MCHC 29 1 g/dL      RDW 19 7 %      MPV 8 8 fL      Platelets 839 Thousands/uL      nRBC 0 /100 WBCs      Neutrophils Relative 62 %      Immat GRANS % 0 %      Lymphocytes Relative 29 %      Monocytes Relative 7 %      Eosinophils Relative 1 %      Basophils Relative 1 %      Neutrophils Absolute 4 88 Thousands/µL      Immature Grans Absolute 0 02 Thousand/uL      Lymphocytes Absolute 2 22 Thousands/µL      Monocytes Absolute 0 52 Thousand/µL      Eosinophils Absolute 0 08 Thousand/µL      Basophils Absolute 0 06 Thousands/µL                  XR chest 1 view portable   Final Result by Noé Ventura MD (11/09 9466)      No acute cardiopulmonary disease  Findings are stable            Workstation performed: [id number]                    Procedures  Procedures         ED Course                                             MDM  Number of Diagnoses or Management Options  Chest pain  Nausea and vomiting  Diagnosis management comments: 80-year-old female chest pain, nausea vomiting, likely GI related, will check cardiac screen, reassess  Disposition  Final diagnoses:   Chest pain   Nausea and vomiting     Time reflects when diagnosis was documented in both MDM as applicable and the Disposition within this note     Time User Action Codes Description Comment    11/8/2022  9:10 PM Americo Pimentel Add [R07 9] Chest pain     11/8/2022  9:10 PM Annie Pimentel Add [R11 2] Nausea and vomiting       ED Disposition     ED Disposition   Discharge    Condition   Stable    Date/Time   Tue Nov 8, 2022  9:10 PM    Comment   Lv Chen discharge to home/self care  Follow-up Information    None         Discharge Medication List as of 11/8/2022  9:12 PM      START taking these medications    Details   famotidine (PEPCID) 20 mg tablet Take 1 tablet (20 mg total) by mouth 2 (two) times a day, Starting Tue 11/8/2022, Normal      !! ondansetron (Zofran ODT) 4 mg disintegrating tablet Take 1 tablet (4 mg total) by mouth every 6 (six) hours as needed for nausea or vomiting, Starting Tue 11/8/2022, Normal       !! - Potential duplicate medications found  Please discuss with provider        CONTINUE these medications which have NOT CHANGED    Details   dicyclomine (BENTYL) 20 mg tablet Take 1 tablet (20 mg total) by mouth 2 (two) times a day, Starting Sun 9/4/2022, Normal      ketotifen (ZADITOR) 0 025 % ophthalmic solution Administer 1 drop to both eyes 2 (two) times a day for 7 days, Starting Fri 8/26/2022, Until Fri 9/2/2022, Print      !! ondansetron (Zofran ODT) 4 mg disintegrating tablet Take 1 tablet (4 mg total) by mouth every 6 (six) hours as needed for nausea or vomiting, Starting Sun 9/4/2022, Normal       !! - Potential duplicate medications found  Please discuss with provider  No discharge procedures on file      PDMP Review     None          ED Provider  Electronically Signed by           Nabil Orlando MD  11/13/22 4173

## 2022-12-25 ENCOUNTER — HOSPITAL ENCOUNTER (EMERGENCY)
Facility: HOSPITAL | Age: 32
Discharge: HOME | End: 2022-12-25
Admitting: EMERGENCY MEDICINE

## 2022-12-25 VITALS
RESPIRATION RATE: 18 BRPM | DIASTOLIC BLOOD PRESSURE: 61 MMHG | OXYGEN SATURATION: 100 % | TEMPERATURE: 98.7 F | SYSTOLIC BLOOD PRESSURE: 116 MMHG | HEART RATE: 74 BPM

## 2022-12-25 DIAGNOSIS — Z71.1 WORRIED WELL: Primary | ICD-10-CM

## 2022-12-25 NOTE — ED PROVIDER NOTES
History  Chief Complaint   Patient presents with   • Tingling     Pt arrives via EMS c/o of hand tingling x 30 min  Pt states "I'm coming off the street, I have nowhere to go and my hands are tingling from it being so cold"     Patient is a 40-year-old female with a past medical history of anxiety and depression presenting to the emergency department for evaluation of tingling of her hands for the past 30 minutes  Reports she has been living on the streets for the past 3 hours and had tingling of her hands secondary to the cold  Patient reports she was previously staying in hot in [place]  Reports she does not have tingling of her fingers on presentation  Patient reports she feels much better and back to her baseline  Denies fevers, chills, rash, headache, weakness, dizziness, visual changes, abdominal pain, nausea, vomiting, diarrhea, constipation, chest pain, shortness of breath or difficulty breathing  Does not offer any other concerns or complaints  History provided by:  Patient   used: No        Prior to Admission Medications   Prescriptions Last Dose Informant Patient Reported? Taking?    QUEtiapine (SEROquel) 50 mg tablet   Yes No   Sig: Take 50 mg by mouth daily   dicyclomine (BENTYL) 20 mg tablet   No No   Sig: Take 1 tablet (20 mg total) by mouth 2 (two) times a day   Patient not taking: Reported on 11/10/2022   dolutegravir (TIVICAY) 50 MG TABS   Yes No   Sig: Take 50 mg by mouth in the morning   emtricitabine-tenofovir (TRUVADA) 200-300 mg per tablet   Yes No   Sig: Take 1 tablet by mouth   famotidine (PEPCID) 20 mg tablet   No No   Sig: Take 1 tablet (20 mg total) by mouth 2 (two) times a day   Patient not taking: Reported on 11/10/2022   ketotifen (ZADITOR) 0 025 % ophthalmic solution   No No   Sig: Administer 1 drop to both eyes 2 (two) times a day for 7 days   loratadine (CLARITIN) 10 mg tablet   Yes No   Sig: Take 10 mg by mouth daily   ondansetron (Zofran ODT) 4 mg disintegrating tablet   No No   Sig: Take 1 tablet (4 mg total) by mouth every 6 (six) hours as needed for nausea or vomiting   Patient not taking: Reported on 11/10/2022   ondansetron (Zofran ODT) 4 mg disintegrating tablet   No No   Sig: Take 1 tablet (4 mg total) by mouth every 6 (six) hours as needed for nausea or vomiting   Patient not taking: Reported on 11/10/2022      Facility-Administered Medications: None       Past Medical History:   Diagnosis Date   • Addiction to drug [facility])    • Anxiety    • Depression        History reviewed  No pertinent surgical history  History reviewed  No pertinent family history  I have reviewed and agree with the history as documented  E-Cigarette/Vaping   • E-Cigarette Use Never User      E-Cigarette/Vaping Substances   • Nicotine No    • THC No    • CBD No    • Flavoring No    • Other No    • Unknown No      Social History     Tobacco Use   • Smoking status: Some Days   • Smokeless tobacco: Never   • Tobacco comments:     Pt reports having smoked a pack of cigarettes yesterday   Vaping Use   • Vaping Use: Never used   Substance Use Topics   • Alcohol use: Never     Comment: socially   • Drug use: Yes     Types: Marijuana       Review of Systems   Constitutional: Negative for chills and fever  HENT: Negative for ear pain and sore throat  Eyes: Negative for pain and visual disturbance  Respiratory: Negative for cough and shortness of breath  Cardiovascular: Negative for chest pain and palpitations  Gastrointestinal: Negative for abdominal pain, constipation, diarrhea, nausea and vomiting  Genitourinary: Negative for dysuria and hematuria  Musculoskeletal: Negative for arthralgias, back pain, joint swelling, myalgias, neck pain and neck stiffness  Skin: Negative for color change and rash  Neurological: Negative for dizziness, seizures, syncope, weakness, light-headedness, numbness and headaches     All other systems reviewed and are negative  Physical Exam  Physical Exam  Vitals and nursing note reviewed  Constitutional:       General: She is not in acute distress  Appearance: Normal appearance  She is well-developed  She is not ill-appearing, toxic-appearing or diaphoretic  HENT:      Head: Normocephalic and atraumatic  Right Ear: External ear normal       Left Ear: External ear normal       Nose: Nose normal       Mouth/Throat:      Mouth: Mucous membranes are moist    Eyes:      General: No scleral icterus  Right eye: No discharge  Left eye: No discharge  Conjunctiva/sclera: Conjunctivae normal    Cardiovascular:      Rate and Rhythm: Normal rate and regular rhythm  Heart sounds: No murmur heard  Pulmonary:      Effort: Pulmonary effort is normal  No respiratory distress  Breath sounds: Normal breath sounds  Abdominal:      Palpations: Abdomen is soft  Tenderness: There is no abdominal tenderness  Musculoskeletal:         General: No swelling, deformity or signs of injury  Normal range of motion  Right hand: Normal  No tenderness  Normal range of motion  Normal strength  Normal sensation  Normal capillary refill  Normal pulse  Left hand: Normal  No tenderness  Normal range of motion  Normal strength  Normal sensation  Normal capillary refill  Normal pulse  Cervical back: Normal range of motion and neck supple  No rigidity  Skin:     General: Skin is warm and dry  Capillary Refill: Capillary refill takes less than 2 seconds  Coloration: Skin is not jaundiced  Findings: No erythema or rash  Neurological:      General: No focal deficit present  Mental Status: She is alert and oriented to person, place, and time  Mental status is at baseline  Cranial Nerves: No cranial nerve deficit  Gait: Gait normal    Psychiatric:         Mood and Affect: Mood normal          Behavior: Behavior normal          Thought Content:  Thought content normal  Judgment: Judgment normal          Vital Signs  ED Triage Vitals [12/25/22 0542]   Temperature Pulse Respirations Blood Pressure SpO2   98 7 °F (37 1 °C) 74 18 116/61 100 %      Temp Source Heart Rate Source Patient Position - Orthostatic VS BP Location FiO2 (%)   Oral Monitor Sitting Left arm --      Pain Score       --           Vitals:    12/25/22 0542   BP: 116/61   Pulse: 74   Patient Position - Orthostatic VS: Sitting         Visual Acuity      ED Medications  Medications - No data to display    Diagnostic Studies  Results Reviewed     None                 No orders to display              Procedures  Procedures         ED Course                       SBIRT 22yo+    Flowsheet Row Most Recent Value   SBIRT (25 yo +)    In order to provide better care to our patients, we are screening all of our patients for alcohol and drug use  Would it be okay to ask you these screening questions? Yes Filed at: 12/25/2022 6266   Initial Alcohol Screen: US AUDIT-C     1  How often do you have a drink containing alcohol? 0 Filed at: 12/25/2022 0721   2  How many drinks containing alcohol do you have on a typical day you are drinking? 0 Filed at: 12/25/2022 0721   3a  Male UNDER 65: How often do you have five or more drinks on one occasion? 0 Filed at: 12/25/2022 0721   3b  FEMALE Any Age, or MALE 65+: How often do you have 4 or more drinks on one occassion? 0 Filed at: 12/25/2022 0710   Audit-C Score 0 Filed at: 12/25/2022 3971   ARASH: How many times in the past year have you    Used an illegal drug or used a prescription medication for non-medical reasons? Never Filed at: 12/25/2022 4126                    MDM  Number of Diagnoses or Management Options  Worried well: new and requires workup  Diagnosis management comments: This is a 77-year-old female presenting to the emergency department for tingling of her fingers  Reports she was out in the cold intermittently over the past 3 hours    Reports fingers have been tingling for the past 30 minutes  Reports she was previously seen in a hotel  Patient reports her symptoms have resolved since presenting to the emergency department  Reports her fingers have warmed up and she has regained all sensation  Denies any difficulty moving her fingers, any numbness, tingling, weakness, pain or color change  Patient is well-appearing with stable vital signs on initial examination  Differential diagnosis to include but is not limited to: Cold exposure    Final ED assessment: Patient is stable and well appearing  Discussed follow-up with PCP  Resources for [facility] was supplied  Strict return precautions were discussed including but not limited to pain, redness, tingling, swelling, decreased range of motion, color change    Patient verbalized understanding and is agreeable with the plan for discharge  Amount and/or Complexity of Data Reviewed  Review and summarize past medical records: yes        Disposition  Final diagnoses:   Worried well     Time reflects when diagnosis was documented in both MDM as applicable and the Disposition within this note     Time User Action Codes Description Comment    12/25/2022  7:20 AM Ramiro Wood [Z71 1] Worried well       ED Disposition     ED Disposition   Discharge    Condition   Stable    Date/Time   Sun Dec 25, 2022  7:19 AM    Comment   Alfonso Mclain discharge to home/self care                 Follow-up Information     Follow up With Specialties Details Why Contact Info Additional [address] [facility] Emergency Department Emergency Medicine Go to  If symptoms worsen [address] [facility] [id number] [facility] Emergency Department, [address] [place], [place], [address], DO Family Medicine Call in 3 days For follow up [address]  [phone]             Discharge Medication List as of 12/25/2022  7:21 AM      CONTINUE these medications which have NOT CHANGED    Details   dicyclomine (BENTYL) 20 mg tablet Take 1 tablet (20 mg total) by mouth 2 (two) times a day, Starting Sun 9/4/2022, Normal      dolutegravir (TIVICAY) 50 MG TABS Take 50 mg by mouth in the morning, Starting Tue 5/17/2022, Historical Med      emtricitabine-tenofovir (TRUVADA) 200-300 mg per tablet Take 1 tablet by mouth, Starting Tue 5/17/2022, Historical Med      famotidine (PEPCID) 20 mg tablet Take 1 tablet (20 mg total) by mouth 2 (two) times a day, Starting Tue 11/8/2022, Normal      ketotifen (ZADITOR) 0 025 % ophthalmic solution Administer 1 drop to both eyes 2 (two) times a day for 7 days, Starting Fri 8/26/2022, Until Fri 9/2/2022, Print      loratadine (CLARITIN) 10 mg tablet Take 10 mg by mouth daily, Starting Tue 9/20/2022, Historical Med      !! ondansetron (Zofran ODT) 4 mg disintegrating tablet Take 1 tablet (4 mg total) by mouth every 6 (six) hours as needed for nausea or vomiting, Starting Sun 9/4/2022, Normal      !! ondansetron (Zofran ODT) 4 mg disintegrating tablet Take 1 tablet (4 mg total) by mouth every 6 (six) hours as needed for nausea or vomiting, Starting Tue 11/8/2022, Normal      QUEtiapine (SEROquel) 50 mg tablet Take 50 mg by mouth daily, Starting Tue 9/20/2022, Historical Med       !! - Potential duplicate medications found  Please discuss with provider  No discharge procedures on file      PDMP Review     None          ED Provider  Electronically Signed by           Khalida Gold PA-C  12/25/22 2736

## 2024-09-25 NOTE — DISCHARGE INSTRUCTIONS
Follow up with PCP  Resources provided  Return to the ED with new or worsening symptoms [] : septum deviated to the right [Normal] : no rashes [FreeTextEntry1] : No hoarseness.  [de-identified] : Thyroid gland normal size, no palpable nodules.  [de-identified] : Bilateral collapsing EAC meatus.  Cerumen impactions removed with curette bilaterally. [de-identified] : 0+ bilateral

## 2024-10-08 NOTE — ED PROVIDER NOTE - PR
Directed her to individual self directed course called Launch my health: Putting out the Flame: Anti-inflammatory Nutrition  [email or web address]    Info on zepbound    Please take a look at the this website:  [email or web address]://www.[email or web address]/    Here are instructions for use: https://uspl.NOZA.Liveclubs/zepbound/zepbound.html#micrograms    It Is a weekly injection.      Dose is escalated monthly with max dose as the goal unless side effects occur.    Common side effects;  nausea, diarrhea, or vomiting.    In order to mitigate these gastrointestinal side effects, they may find it helpful to:    Eat smaller meals--suggest that they split 3 daily meals into 4 or more smaller meals  Stop eating when they feel full  Avoid fatty foods  Try eating bland foods  Encourage patients to continue to drink plenty of water and eat healthy meals to ensure they meet their needs for protein, micronutrients, fiber, and fluids    RECOMMEND that patients who are using oral hormonal contraceptives switch to a non-oral contraceptive method, or add a barrier method of contraception, for 4 weeks after initiation with Zepbound and for 4 weeks after each dose escalation. Zepbound delays gastric emptying, so it may make oral contraceptives less effective.    Dose escalation once monthly (slower if side effects) use 1 time each week  2.5 mg/0.5 mL single-dose pen  5 mg/0.5 mL single-dose pen  7.5 mg/0.5 mL single-dose pen  10 mg/0.5 mL single-dose pen  12.5 mg/0.5 mL single-dose pen  15 mg/0.5 mL single-dose pen    
126
